# Patient Record
Sex: FEMALE | Race: WHITE | NOT HISPANIC OR LATINO | Employment: OTHER | URBAN - METROPOLITAN AREA
[De-identification: names, ages, dates, MRNs, and addresses within clinical notes are randomized per-mention and may not be internally consistent; named-entity substitution may affect disease eponyms.]

---

## 2017-01-03 ENCOUNTER — THERAPY VISIT (OUTPATIENT)
Dept: PHYSICAL THERAPY | Facility: CLINIC | Age: 57
End: 2017-01-03
Payer: COMMERCIAL

## 2017-01-03 DIAGNOSIS — M25.552 LEFT HIP PAIN: Primary | ICD-10-CM

## 2017-01-03 PROCEDURE — 97110 THERAPEUTIC EXERCISES: CPT | Mod: GP | Performed by: PHYSICAL THERAPIST

## 2017-01-03 PROCEDURE — 97161 PT EVAL LOW COMPLEX 20 MIN: CPT | Mod: GP | Performed by: PHYSICAL THERAPIST

## 2017-01-03 NOTE — PROGRESS NOTES
Subjective:    Marycruz Tran is a 56 year old female with a left hip condition.  Condition occurred with:  Repetition/overuse.  Condition occurred: during recreation/sport.  This is a new and recurrent condition  Pt reports she had a left hamstring problem that she has had in the past. She likes to run 3 miles 3x week and about 2 months ago she began to note some pain in the left ischial tuberosity area that has been getting worse. She self referred for PT. She has pain with running and walking and sitting. She belongs to a club and can do eliptical, she is not sure if it hurt or not. She has had PT in the past that was helpful.    Patient reports pain:  Other (ischial tuberosity).  Radiates to:  Gluteals.  Pain is described as aching and is intermittent and reported as 8/10.   Pain is worse during the day and worse in the P.M..  Symptoms are exacerbated by sitting and walking and relieved by rest.  Since onset symptoms are gradually worsening.                                                    Objective:    System                                           Hip Evaluation  HIP AROM:  AROM:   Left Hip:     Normal    Right Hip:                    Hip PROM:  Hip PROM:  Left Hip:   Normal  Right Hip:                           Hip Strength:    Flexion:   Left: 5-/5   Pain:                      Extension:  Left: 4+/5  Pain:  Abduction:  Left: 4+/5     Pain:    Internal Rotation:  Left: 4+/5    Pain:  External Rotation:  Left: 4+/5   Pain:    Knee Flexion:  Left: 5-/5   -  Pain:  Knee Extension:  Left: 5/5   Pain:        Hip Special Testing:      Left hip negative for the following special tests:  Piriformis; Elias; Fadir/Labrum or SLR      Hip Palpation:  Normal (no tenderness over ischial tuberosity)         Functional Testing:          Quad:    Single leg squat:   Left:    Moderate loss of control  Right:   Moderate loss of control    Bilateral leg squat:  Mild loss of control                    General      ROS    Assessment/Plan:      Patient is a 56 year old female with left side hip complaints.    Patient has the following significant findings with corresponding treatment plan.                Diagnosis 1:  L hamstring strain  Pain -  hot/cold therapy, US and mechanical traction  Decreased strength - therapeutic exercise and therapeutic activities  Impaired muscle performance - neuro re-education  Decreased function - therapeutic activities    Therapy Evaluation Codes:   1) History comprised of:   Personal factors that impact the plan of care:      None.    Comorbidity factors that impact the plan of care are:      None.     Medications impacting care: None.  2) Examination of Body Systems comprised of:   Body structures and functions that impact the plan of care:      Hip, Pelvis and Sacral illiac joint.   Activity limitations that impact the plan of care are:      Sitting, Squatting/kneeling and Walking.   Clinical presentation characteristics are:    Stable/Uncomplicated.  3) Presentation comprised of:   Presentation scored as Low complexity with uncomplicated characteristics..  4) Decision-Making    Low complexity using standardized patient assessment instrument and/or measureable assessment of functional outcome.  Cumulative Therapy Evaluation is: Low complexity.    Previous and current functional limitations:  (See Goal Flow Sheet for this information)    Short term and Long term goals: (See Goal Flow Sheet for this information)     Communication ability:  Patient appears to be able to clearly communicate and understand verbal and written communication and follow directions correctly.  Treatment Explanation - The following has been discussed with the patient:   RX ordered/plan of care  Anticipated outcomes  Possible risks and side effects  This patient would benefit from PT intervention to resume normal activities.   Rehab potential is good.    Frequency:  1 X week, once daily  Duration:  for 6  weeks  Discharge Plan:  Achieve all LTG.  Independent in home treatment program.  Reach maximal therapeutic benefit.    Please refer to the daily flowsheet for treatment today, total treatment time and time spent performing 1:1 timed codes.

## 2017-01-04 NOTE — PROGRESS NOTES
Subjective:                                       Pertinent medical history includes:  Osteoporosis.  Medical allergies: yes (Augmentin).  Other surgeries include:  Orthopedic surgery and other (Broken Wrist - Plate, Laparoscopy for endometriosis).  Current medications:  Pain medication and anti-inflammatory.  Current occupation is P.R. Consultant.    Primary job tasks include:  Prolonged sitting and other (Computer Work).                              Objective:    System    Physical Exam    General     ROS    Assessment/Plan:

## 2017-01-10 ENCOUNTER — MYC MEDICAL ADVICE (OUTPATIENT)
Dept: OBGYN | Facility: CLINIC | Age: 57
End: 2017-01-10

## 2017-01-11 ENCOUNTER — MYC MEDICAL ADVICE (OUTPATIENT)
Dept: OBGYN | Facility: CLINIC | Age: 57
End: 2017-01-11

## 2017-01-11 NOTE — TELEPHONE ENCOUNTER
Called pt and verified when flu shot was recevied. Pt stated 11/5/16. Pt is also requesting her chart be updated with the other immunizations she has recevied. Informed pt I would do as soon as I can.

## 2017-01-17 ENCOUNTER — THERAPY VISIT (OUTPATIENT)
Dept: PHYSICAL THERAPY | Facility: CLINIC | Age: 57
End: 2017-01-17
Payer: COMMERCIAL

## 2017-01-17 DIAGNOSIS — M25.552 LEFT HIP PAIN: Primary | ICD-10-CM

## 2017-01-17 PROCEDURE — 97110 THERAPEUTIC EXERCISES: CPT | Mod: GP | Performed by: PHYSICAL THERAPIST

## 2017-01-17 PROCEDURE — 97112 NEUROMUSCULAR REEDUCATION: CPT | Mod: GP | Performed by: PHYSICAL THERAPIST

## 2017-01-24 ENCOUNTER — THERAPY VISIT (OUTPATIENT)
Dept: PHYSICAL THERAPY | Facility: CLINIC | Age: 57
End: 2017-01-24
Payer: COMMERCIAL

## 2017-01-24 DIAGNOSIS — M25.552 LEFT HIP PAIN: Primary | ICD-10-CM

## 2017-01-24 PROCEDURE — 97110 THERAPEUTIC EXERCISES: CPT | Mod: GP | Performed by: PHYSICAL THERAPIST

## 2017-01-24 PROCEDURE — 97112 NEUROMUSCULAR REEDUCATION: CPT | Mod: GP | Performed by: PHYSICAL THERAPIST

## 2017-02-01 ENCOUNTER — THERAPY VISIT (OUTPATIENT)
Dept: PHYSICAL THERAPY | Facility: CLINIC | Age: 57
End: 2017-02-01
Payer: COMMERCIAL

## 2017-02-01 DIAGNOSIS — M25.552 LEFT HIP PAIN: Primary | ICD-10-CM

## 2017-02-01 PROCEDURE — 97140 MANUAL THERAPY 1/> REGIONS: CPT | Mod: GP | Performed by: PHYSICAL THERAPIST

## 2017-02-01 PROCEDURE — 97110 THERAPEUTIC EXERCISES: CPT | Mod: GP | Performed by: PHYSICAL THERAPIST

## 2017-02-01 PROCEDURE — 97035 APP MDLTY 1+ULTRASOUND EA 15: CPT | Mod: GP | Performed by: PHYSICAL THERAPIST

## 2017-02-08 ENCOUNTER — THERAPY VISIT (OUTPATIENT)
Dept: PHYSICAL THERAPY | Facility: CLINIC | Age: 57
End: 2017-02-08
Payer: COMMERCIAL

## 2017-02-08 DIAGNOSIS — M25.552 LEFT HIP PAIN: Primary | ICD-10-CM

## 2017-02-08 PROCEDURE — 97035 APP MDLTY 1+ULTRASOUND EA 15: CPT | Mod: GP | Performed by: PHYSICAL THERAPIST

## 2017-02-08 PROCEDURE — 97112 NEUROMUSCULAR REEDUCATION: CPT | Mod: GP | Performed by: PHYSICAL THERAPIST

## 2017-02-08 PROCEDURE — 97110 THERAPEUTIC EXERCISES: CPT | Mod: GP | Performed by: PHYSICAL THERAPIST

## 2017-02-08 PROCEDURE — 97140 MANUAL THERAPY 1/> REGIONS: CPT | Mod: GP | Performed by: PHYSICAL THERAPIST

## 2017-02-15 ENCOUNTER — THERAPY VISIT (OUTPATIENT)
Dept: PHYSICAL THERAPY | Facility: CLINIC | Age: 57
End: 2017-02-15
Payer: COMMERCIAL

## 2017-02-15 DIAGNOSIS — M25.552 LEFT HIP PAIN: ICD-10-CM

## 2017-02-15 PROCEDURE — 97140 MANUAL THERAPY 1/> REGIONS: CPT | Mod: GP | Performed by: PHYSICAL THERAPIST

## 2017-02-15 PROCEDURE — 97110 THERAPEUTIC EXERCISES: CPT | Mod: GP | Performed by: PHYSICAL THERAPIST

## 2017-02-15 PROCEDURE — 97035 APP MDLTY 1+ULTRASOUND EA 15: CPT | Mod: GP | Performed by: PHYSICAL THERAPIST

## 2017-02-15 PROCEDURE — 97112 NEUROMUSCULAR REEDUCATION: CPT | Mod: GP | Performed by: PHYSICAL THERAPIST

## 2017-02-22 ENCOUNTER — THERAPY VISIT (OUTPATIENT)
Dept: PHYSICAL THERAPY | Facility: CLINIC | Age: 57
End: 2017-02-22
Payer: COMMERCIAL

## 2017-02-22 DIAGNOSIS — M25.552 LEFT HIP PAIN: ICD-10-CM

## 2017-02-22 PROCEDURE — 97035 APP MDLTY 1+ULTRASOUND EA 15: CPT | Mod: GP | Performed by: PHYSICAL THERAPIST

## 2017-02-22 PROCEDURE — 97110 THERAPEUTIC EXERCISES: CPT | Mod: GP | Performed by: PHYSICAL THERAPIST

## 2017-02-22 PROCEDURE — 97140 MANUAL THERAPY 1/> REGIONS: CPT | Mod: GP | Performed by: PHYSICAL THERAPIST

## 2017-02-22 NOTE — PROGRESS NOTES
Subjective:    HPI                    Objective:    System    Physical Exam    General     ROS    Assessment/Plan:      PROGRESS  REPORT    Progress reporting period is from 1-3-17 to 2-22-17.       SUBJECTIVE   Subjective: Still has relief after PT for several days until she took a 3 mile walk (45') after that she has had more discomfort in the glut.     Current Pain level:0- 4/10.     Previous pain level was  4/10  .   Changes in function:  Yes (See Goal flowsheet attached for changes in current functional level)  Adverse reaction to treatment or activity: None    OBJECTIVE  Changes noted in objective findings:  The objective findings below are from DOS 2-22-17.  Objective: Is also having some medial knee pain for the past 4 days or so.  Sitting in the car was not painful but sitting in bed was very uncomfotable Medial HS is tender to palpate at the knee and slightly swollen. May be due to SL dead lifts she will dc these for now. Less tenderness to palpate the piriformis noted today. Plans to see Forest Tu WILEY for several visits then return to me. if she has started running again I will do a running eval on the treadmill next.      ASSESSMENT/PLAN  Updated problem list and treatment plan: Diagnosis 1:  L buttock pain  Pain -  hot/cold therapy, US and manual therapy  Decreased joint mobility - manual therapy and therapeutic exercise  Impaired muscle performance - neuro re-education  Decreased function - therapeutic activities  STG/LTGs have been met or progress has been made towards goals:  Yes (See Goal flow sheet completed today.)  Assessment of Progress: The patient's condition has potential to improve.  Self Management Plans:  Patient has been instructed in a home treatment program.  I have re-evaluated this patient and find that the nature, scope, duration and intensity of the therapy is appropriate for the medical condition of the patient.  Marycruz continues to require the following intervention to meet  STG and LTG's:  PT    Recommendations:  This patient would benefit from continued therapy.     Frequency:  1 X week, once daily  Duration:  for 6 weeks        Please refer to the daily flowsheet for treatment today, total treatment time and time spent performing 1:1 timed codes.

## 2017-02-28 ENCOUNTER — THERAPY VISIT (OUTPATIENT)
Dept: CHIROPRACTIC MEDICINE | Facility: CLINIC | Age: 57
End: 2017-02-28
Payer: COMMERCIAL

## 2017-02-28 DIAGNOSIS — M25.552 HIP PAIN, LEFT: ICD-10-CM

## 2017-02-28 DIAGNOSIS — M99.05 SOMATIC DYSFUNCTION OF PELVIS REGION: Primary | ICD-10-CM

## 2017-02-28 DIAGNOSIS — M79.10 MYALGIA: ICD-10-CM

## 2017-02-28 PROCEDURE — 99202 OFFICE O/P NEW SF 15 MIN: CPT | Performed by: CHIROPRACTOR

## 2017-02-28 NOTE — MR AVS SNAPSHOT
After Visit Summary   2/28/2017    Marycruz Tran    MRN: [id number]           Patient Information     Date Of Birth          1960        Visit Information        Provider Department      2/28/2017 4:00 PM Forest Barroso DC [facility] [place] [facility]        Today's Diagnoses     Somatic dysfunction of pelvis region    -  1    Hip pain, left        Myalgia           Follow-ups after your visit        Your next 10 appointments already scheduled     Mar 08, 2017  9:45 AM CST   AN[facility] Chiropractor with Forest Barroso DC   [facility] [place] [facility] ([facility] [place]  )    [address]  [place] [address]   [phone]            Mar 10, 2017 11:00 AM CST   ANÍBAL Running with Radha Mccann PT   [facility] ([facility]  )    [address]  [facility] [id number] [phone]            Mar 14, 2017  4:45 PM CDT   ANÍBAL Chiropractor with Forest Barroso DC   [facility] ([facility] [place])    [address]  [place] [address] [phone]              Who to contact     If you have questions or need follow up information about today's clinic visit or your schedule please contact [facility] directly at [phone].  Normal or non-critical lab and imaging results will be communicated to you by MyChart, letter or phone within 4 business days after the clinic has received the results. If you do not hear from us within 7 days, please contact the clinic through MyChart or phone. If you have a critical or abnormal lab result, we will notify you by phone as soon as possible.  Submit refill requests through [employer] or call your pharmacy and they will forward the refill request to us. Please allow 3 business days for your refill to be completed.          Additional Information About Your  Visit        MyChart Information     [employer] gives you secure access to your electronic health record. If you see a primary care provider, you can also send messages to your care team and make appointments. If you have questions, please call your primary care clinic.  If you do not have a primary care provider, please call [phone] and they will assist you.        Care EveryWhere ID     This is your Care EveryWhere ID. This could be used by other organizations to access your [place] medical records  [id number]         Blood Pressure from Last 3 Encounters:   12/06/16 120/80   10/03/16 104/75   01/08/16 113/81    Weight from Last 3 Encounters:   12/06/16 68.4 kg (150 lb 12.8 oz)   10/03/16 63.5 kg (140 lb)   01/08/16 66.8 kg (147 lb 3.2 oz)              We Performed the Following     OFFICE/OUTPT VISIT,SHAE WHARTON II        Primary Care Provider Office Phone # Fax #    Fabienne Haley [phone]       [facility] WOM[facility] [address] [place] [address] [facility] [address]  [facility] [address]        Thank you!     Thank you for choosing [facility]  for your care. Our goal is always to provide you with excellent care. Hearing back from our patients is one way we can continue to improve our services. Please take a few minutes to complete the written survey that you may receive in the mail after your visit with us. Thank you!             Your Updated Medication List - Protect others around you: Learn how to safely use, store and throw away your medicines at www.disposemymeds.org.          This list is accurate as of: 2/28/17 10:08 PM.  Always use your most recent med list.                   Brand Name Dispense Instructions for use    calcium carb-cholecalciferol 600-500 MG-UNIT Caps      Take 1 tablet by mouth 2 times daily       estradiol 0.1 MG/GM cream    ESTRACE VAGINAL    42.5 g    Place 1 g vaginally twice a week Place 1g vaginally nightly x 2 weeks and then twice  brenda for maintenance       IBUPROFEN PO      Take 400 mg by mouth every 4 hours as needed for moderate pain       Multi-vitamin Tabs tablet      Take 1 tablet by mouth daily

## 2017-02-28 NOTE — PROGRESS NOTES
[facility]  Feb 28, 2017    I have been seeing Radha , PT, at the [facility] since early January for a running injury. She thinks it s piriformis syndrome, compounded by my body s apparent reluctance to use my glutes when it should. ;)    Radha was going to email you with my record and treatment thus far, and she said I should reach out directly to see if you think coming to see you would be a helpful addition to getting me back on track. My pain has improved this past week, but it Is still present and gets easily aggravated. I won t return to running until things are ok.    I had also heard of you from a running friend of mine (Raquel Patel) who said she knew a number of runners who d had successful treatment with you.    I really enjoy working with Radha, and will continue to do so as appropriate - but she thought you might be able to do some things beyond what she s offering. I am open to whatever will get me out of pain and back on the running path.    Please let me know if I should make an appointment to see you - and how to do so.    Subjective:  Marycruz rTan   56 year old   female    CC: Left butt muscle pain and knee pain, referral from Radha PT  Medications reviewed: Denies any medication s  Visit: 1/6  Goal: sit for 30 minutes without pain, walk 30 minutes without pain, decrease pain 50%, run again    Location: L posterior hip   KAILA: Notes runner, 30 years, slow and steady according to her, notes previous history of this pain, notes few months ago started up again. Has not ran since 12/2016. Started seeing PT in 1/2017 weekly and notes that she has been working on piriformis syndrome and working on glute strength. Notes some progress. Notes also saw DC a couple times. Walking is going OK, but that has created pain in past.   Pain: varies but 3/10 on average, worse with sitting  Previous History: broken left wrist in January 2016  FAM   Progression: PT has helped  Quality: ache and tightness   Radiation: Denies current or previous   Pain is worse with: sitting for long periods, long walks, unable to walk long periods   Pain is better with: stretching, exercises   Timing: frequent pain   Under care: Has worked with PT and DC for this  Imaging: Denies any   Social: Alert, oriented, and active. 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       Objective:  Inspection:  No SDD  No Scars  Normal Gait    Palpation:  No specific pain  Palpable soreness over L posterior hip   Myofascitis 2/4 noted over L piriformis    ROM:  Lumbar flexion   90/90  Lumbar extension  30/30, mild lower back discomfort   Right Hip IR  38/45  Left Hip IR  29/45  Right Hip ER  42/60  Left  hip ER  39/60  Hip abduction limited on left 5 degrees with no pain  Hip adduction symmetric     MMT:  Left glute med 4/5 with no pain  Right glute med 5/5 with no pain  Left TFL 4/5 with no pain  Right TFL 5/5 with no pain  Left piriformis 5/5 with no pain  Right piriformis 5/5 with no pain    MET:  Right short leg  Right superior pubic bone  Right hip out flare    Squat:  Shift to right  Foot flare to left  Arm drop on right  Left knee genu valgum     Lunge:  L knee genu valgum  L knee cross over     NAL:  Restricted SI on left side    Neuro:  Able to toe walk and heel walk  L4-S1 light touch WNL    Ortho:  SLR (tightness left hamstring), jazmin (posteiror hip pain on left), Fader (-), SI compression (-)     Assessment:  NAL with associated myofascitis and weakness  Hip pain     Plan:   Decrease pain 50%    Restore symmetric hip IR   Restore symmetric hip ER   Strengthen hip stabilizers to 5/5 B   Restore pelvic leveling   Restore segmental motion   Functional goals in history    Patient was given detailed history, review of symptoms, examination, functional examination, and report of findings.. Patients treatment plan with be 2 times per week for 2 weeks  followed by 1 time per week for 2 weeks. Following treatment plan a follow up exam will be done to make sure patient is improving. Treatment frequency will degrease as patients subjective complaints improve as well as objective findings. Prognosis for care is good based on fact that patient is active and is willing to take active approach in care.     Patient tolerated treatment well today  Treatment Time: 45 minutes  [address] manipulation 1-2 segments: MET, Hip LAD  96473 Manual therapy: (ART, Graston, Strain Counter Strain, Fascial Manipulation, Cupping) performed over area of   89876 therapeutic exercise (30 minutes):Review of the following: clams, side lying leg raises, single leg step, tried to do band walks, marching bridges, bridges with walks, flossing has helped in past.  Today: gave seated piriformis flossing    Strapping: hip IR and lateral glide on left  Taping:  Next visit: 1 week  Dry needle: 4 posterior hip (tolerated well)      Forest Barroso DC, MEd, ATC

## 2017-03-08 ENCOUNTER — THERAPY VISIT (OUTPATIENT)
Dept: CHIROPRACTIC MEDICINE | Facility: CLINIC | Age: 57
End: 2017-03-08
Payer: COMMERCIAL

## 2017-03-08 DIAGNOSIS — M79.10 MYALGIA: ICD-10-CM

## 2017-03-08 DIAGNOSIS — M25.552 HIP PAIN, LEFT: ICD-10-CM

## 2017-03-08 DIAGNOSIS — M99.05 SOMATIC DYSFUNCTION OF PELVIS REGION: Primary | ICD-10-CM

## 2017-03-08 PROCEDURE — 97110 THERAPEUTIC EXERCISES: CPT | Performed by: CHIROPRACTOR

## 2017-03-08 PROCEDURE — 98940 CHIROPRACT MANJ 1-2 REGIONS: CPT | Mod: AT | Performed by: CHIROPRACTOR

## 2017-03-10 ENCOUNTER — THERAPY VISIT (OUTPATIENT)
Dept: PHYSICAL THERAPY | Facility: CLINIC | Age: 57
End: 2017-03-10
Payer: COMMERCIAL

## 2017-03-10 DIAGNOSIS — M25.552 LEFT HIP PAIN: ICD-10-CM

## 2017-03-10 PROCEDURE — 97110 THERAPEUTIC EXERCISES: CPT | Mod: GP | Performed by: PHYSICAL THERAPIST

## 2017-03-10 PROCEDURE — 97112 NEUROMUSCULAR REEDUCATION: CPT | Mod: GP | Performed by: PHYSICAL THERAPIST

## 2017-03-10 NOTE — MR AVS SNAPSHOT
After Visit Summary   3/10/2017    Marycruz Tran    MRN: [id number]           Patient Information     Date Of Birth          1960        Visit Information        Provider Department      3/10/2017 11:00 AM Radha Mccann PT [facility]        Today's Diagnoses     Left hip pain           Follow-ups after your visit        Your next 10 appointments already scheduled     Mar 14, 2017  4:45 PM CDT   ANÍBAL Chiropractor with Forest Barroso DC   [facility] [place] [facility] ([facility] [place])    [address] [place] [address]  [address]  [place] [address]   [phone]            Mar 29, 2017  9:45 AM CDT   ANÍBAL Chiropractor with Forest Barroso DC   [facility] [place] [facility] ([facility] [place]  )    [address]  [place] [address]   [phone]            Apr 12, 2017  1:15 PM CDT   ANÍBAL Chiropractor with Forest Barroso DC   [facility] [place] [facility] ([facility] [place]  )    [address]  [place] [address]   [phone]              Who to contact     If you have questions or need follow up information about today's clinic visit or your schedule please contact [facility] directly at [phone].  Normal or non-critical lab and imaging results will be communicated to you by [employer], letter or phone within 4 business days after the clinic has received the results. If you do not hear from us within 7 days, please contact the clinic through [employer] or phone. If you have a critical or abnormal lab result, we will notify you by phone as soon as possible.  Submit refill requests through [employer] or call your pharmacy and they will forward the refill request to us. Please allow 3 business days for your refill to be completed.          Additional Information About Your Visit        [employer] Information     [employer] gives you secure access to your electronic health record. If  you see a primary care provider, you can also send messages to your care team and make appointments. If you have questions, please call your primary care clinic.  If you do not have a primary care provider, please call [phone] and they will assist you.        Care EveryWhere ID     This is your Care EveryWhere ID. This could be used by other organizations to access your [place] medical records  [id number]         Blood Pressure from Last 3 Encounters:   12/06/16 120/80   10/03/16 104/75   01/08/16 113/81    Weight from Last 3 Encounters:   12/06/16 68.4 kg (150 lb 12.8 oz)   10/03/16 63.5 kg (140 lb)   01/08/16 66.8 kg (147 lb 3.2 oz)              We Performed the Following     NEUROMUSCULAR RE-EDUCATION     THERAPEUTIC EXERCISES        Primary Care Provider Office Phone # Fax #    Fabienne Haley [phone]       [facility] [address] [facility] [address]  [facility] [address]        Thank you!     Thank you for [facility] THER[facility]  for your care. Our goal is always to provide you with excellent care. Hearing back from our patients is one way we can continue to improve our services. Please take a few minutes to complete the written survey that you may receive in the mail after your visit with us. Thank you!             Your Updated Medication List - Protect others around you: Learn how to safely use, store and throw away your medicines at www.disposemymeds.org.          This list is accurate as of: 3/10/17 11:42 AM.  Always use your most recent med list.                   Brand Name Dispense Instructions for use    calcium carb-cholecalciferol 600-500 MG-UNIT Caps      Take 1 tablet by mouth 2 times daily       estradiol 0.1 MG/GM cream    ESTRACE VAGINAL    42.5 g    Place 1 g vaginally twice a week Place 1g vaginally nightly x 2 weeks and then twice weely for maintenance       IBUPROFEN PO      Take 400 mg by mouth every 4 hours as  needed for moderate pain       Multi-vitamin Tabs tablet      Take 1 tablet by mouth daily

## 2017-03-10 NOTE — PROGRESS NOTES
[place] [facility]  Feb 28, 2017    I have been seeing Radha , PT, at the [facility] since early January for a running injury. She thinks it s piriformis syndrome, compounded by my body s apparent reluctance to use my glutes when it should. ;)    Radha was going to email you with my record and treatment thus far, and she said I should reach out directly to see if you think coming to see you would be a helpful addition to getting me back on track. My pain has improved this past week, but it Is still present and gets easily aggravated. I won t return to running until things are ok.    I had also heard of you from a running friend of mine (Raquel Patel) who said she knew a number of runners who d had successful treatment with you.    I really enjoy working with Radha, and will continue to do so as appropriate - but she thought you might be able to do some things beyond what she s offering. I am open to whatever will get me out of pain and back on the running path.    Please let me know if I should make an appointment to see you - and how to do so.    Subjective:  Marycruz Tran   56 year old   female    CC: Left butt muscle pain and knee pain, referral from Radah PT  Medications reviewed: Denies any medication s  Visit: 1/6  Goal: sit for 30 minutes without pain, walk 30 minutes without pain, decrease pain 50%, run again    Location: L posterior hip   KAILA: Notes runner, 30 years, slow and steady according to her, notes previous history of this pain, notes few months ago started up again. Has not ran since 12/2016. Started seeing PT in 1/2017 weekly and notes that she has been working on piriformis syndrome and working on glute strength. Notes some progress. Notes also saw DC a couple times. Walking is going OK, but that has created pain in past.   Pain: varies but 3/10 on average, worse with sitting  Comes in today doing OK. Notes was not sore from  session. Wants to make sure she is stretching right. Asked if she should go to PT session later in week and I thought it was good idea. She notes she tolerated exam well and denies any new issues.       Objective:  Inspection:  No SDD  No Scars  Normal Gait    Palpation:  No specific pain  Palpable soreness over L posterior hip   Myofascitis 2/4 noted over L piriformis    ROM:  Lumbar flexion   90/90  Lumbar extension  30/30, mild lower back discomfort   Right Hip IR  38/45  Left Hip IR  29/45  Right Hip ER  42/60  Left  hip ER  39/60  Hip abduction limited on left 5 degrees with no pain  Hip adduction symmetric     MMT:  Left glute med 4/5 with no pain  Right glute med 5/5 with no pain  Left TFL 4/5 with no pain  Right TFL 5/5 with no pain  Left piriformis 5/5 with no pain  Right piriformis 5/5 with no pain    MET:  Right short leg  Right superior pubic bone  Right hip out flare    Squat:  Shift to right  Foot flare to left  Arm drop on right  Left knee genu valgum     Lunge:  L knee genu valgum  L knee cross over     NAL:  Restricted SI on left side      Ortho:  SLR (tightness left hamstring), jazmin (posteiror hip pain on left), Fader (-), SI compression (-)     Assessment:  NAL with associated myofascitis and weakness  Hip pain     Plan:    Patient tolerated treatment well today  Treatment Time: 45 minutes  78197 manipulation 1-2 segments: MET, Hip LAD  70321 Manual therapy: (ART, Graston, Strain Counter Strain, Fascial Manipulation, Cupping) performed over area of   88359 therapeutic exercise (30 minutes):Review of the following: clams, side lying leg raises, single leg step, tried to do band walks, marching bridges, bridges with walks, flossing has helped in past.  Today: gave seated piriformis flossing, reverse clams with yellow band  Strapping: hip IR and lateral glide on left  Taping:  Next visit: 1 week, recommended follow up with PT  Dry needle: 10 posterior hip (tolerated well)      Forest Barroso DC,  MEd, ATC

## 2017-03-14 ENCOUNTER — THERAPY VISIT (OUTPATIENT)
Dept: CHIROPRACTIC MEDICINE | Facility: CLINIC | Age: 57
End: 2017-03-14
Payer: COMMERCIAL

## 2017-03-14 DIAGNOSIS — M25.552 HIP PAIN, LEFT: ICD-10-CM

## 2017-03-14 DIAGNOSIS — M79.10 MYALGIA: ICD-10-CM

## 2017-03-14 DIAGNOSIS — M99.05 SOMATIC DYSFUNCTION OF PELVIS REGION: Primary | ICD-10-CM

## 2017-03-14 PROCEDURE — 97110 THERAPEUTIC EXERCISES: CPT | Performed by: CHIROPRACTOR

## 2017-03-14 PROCEDURE — 98940 CHIROPRACT MANJ 1-2 REGIONS: CPT | Mod: AT | Performed by: CHIROPRACTOR

## 2017-03-14 NOTE — MR AVS SNAPSHOT
After Visit Summary   3/14/2017    Marycruz Tran    MRN: [id number]           Patient Information     Date Of Birth          1960        Visit Information        Provider Department      3/14/2017 4:45 PM Forest Barroso DC [place] [facility]        Today's Diagnoses     Somatic dysfunction of pelvis region    -  1    Hip pain, left        Myalgia           Follow-ups after your visit        Your next 10 appointments already scheduled     Mar 29, 2017  9:45 AM CDT   [facility] Chiropractor with Forest Barroso DC   [facility] [place] [facility] ([facility] [place]  )    [address] [place] [address]. [address]  [place] [address]   [phone]            Apr 12, 2017  1:15 PM CDT   AN[facility]BAL Chiropractor with SAURABH [facility] [place] [facility] ([facility] [place]  )    [address]  [place] [address]   [phone]              Who to contact     If you have questions or need follow up information about today's clinic visit or your schedule please contact [place] [facility] directly at [phone].  Normal or non-critical lab and imaging results will be communicated to you by [employer], letter or phone within 4 business days after the clinic has received the results. If you do not hear from us within 7 days, please contact the clinic through [employer] or phone. If you have a critical or abnormal lab result, we will notify you by phone as soon as possible.  Submit refill requests through [employer] or call your pharmacy and they will forward the refill request to us. Please allow 3 business days for your refill to be completed.          Additional Information About Your Visit        [employer] Information     [employer] gives you secure access to your electronic health record. If you see a primary care provider, you can also send messages to your care team and make appointments. If you have questions, please call your primary care clinic.  If you  do not have a primary care provider, please call [phone] and they will assist you.        Care EveryWhere ID     This is your Care EveryWhere ID. This could be used by other organizations to access your [place] medical records  [id number]         Blood Pressure from Last 3 Encounters:   12/06/16 120/80   10/03/16 104/75   01/08/16 113/81    Weight from Last 3 Encounters:   12/06/16 68.4 kg (150 lb 12.8 oz)   10/03/16 63.5 kg (140 lb)   01/08/16 66.8 kg (147 lb 3.2 oz)              We Performed the Following     CHIROPRAC MANIP,SPINAL,1-2 REGIONS     THERAPEUTIC EXERCISES        Primary Care Provider Office Phone # Fax #    Fabienne Haley [phone]       [facility] [address]  [place] [address]        Thank you!     Thank you for [facility]  [facility]  for your care. Our goal is always to provide you with excellent care. Hearing back from our patients is one way we can continue to improve our services. Please take a few minutes to complete the written survey that you may receive in the mail after your visit with us. Thank you!             Your Updated Medication List - Protect others around you: Learn how to safely use, store and throw away your medicines at www.disposemymeds.org.          This list is accurate as of: 3/14/17  7:21 PM.  Always use your most recent med list.                   Brand Name Dispense Instructions for use    calcium carb-cholecalciferol 600-500 MG-UNIT Caps      Take 1 tablet by mouth 2 times daily       estradiol 0.1 MG/GM cream    ESTRACE VAGINAL    42.5 g    Place 1 g vaginally twice a week Place 1g vaginally nightly x 2 weeks and then twice weely for maintenance       IBUPROFEN PO      Take 400 mg by mouth every 4 hours as needed for moderate pain       Multi-vitamin Tabs tablet      Take 1 tablet by mouth daily

## 2017-03-15 NOTE — PROGRESS NOTES
[place] [facility]  Feb 28, 2017    I have been seeing Radha , PT, at the [facility] since early January for a running injury. She thinks it s piriformis syndrome, compounded by my body s apparent reluctance to use my glutes when it should. ;)    Radha was going to email you with my record and treatment thus far, and she said I should reach out directly to see if you think coming to see you would be a helpful addition to getting me back on track. My pain has improved this past week, but it Is still present and gets easily aggravated. I won t return to running until things are ok.    I had also heard of you from a running friend of mine (Raquel Patel) who said she knew a number of runners who d had successful treatment with you.    I really enjoy working with Radha, and will continue to do so as appropriate - but she thought you might be able to do some things beyond what she s offering. I am open to whatever will get me out of pain and back on the running path.    Please let me know if I should make an appointment to see you - and how to do so.    Subjective:  Marycruz Tran   56 year old   female    CC: Left butt muscle pain and knee pain, referral from Radha PT  Medications reviewed: Denies any medication s  Visit: 2/6  Goal: sit for 30 minutes without pain, walk 30 minutes without pain, decrease pain 50%, run again    Location: L posterior hip   KAILA: Notes runner, 30 years, slow and steady according to her, notes previous history of this pain, notes few months ago started up again. Has not ran since 12/2016. Started seeing PT in 1/2017 weekly and notes that she has been working on piriformis syndrome and working on glute strength. Notes some progress. Notes also saw DC a couple times. Walking is going OK, but that has created pain in past.   Pain: varies but 3/10 on average, worse with sitting  Comes in today doing OK. Had follow up with PT and  went well. Tweaked some of her exercises. Notes tolerated treatment well. Notes some good days and not so good days. Notes not constant pain. Sitting is worse. Notes similar symptoms and denies any new issues.        Objective:  Inspection:  No SDD  No Scars  Normal Gait    Palpation:  No specific pain  Palpable soreness over L posterior hip   Myofascitis 2/4 noted over L piriformis    ROM:  Lumbar flexion   90/90  Lumbar extension  30/30, mild lower back discomfort   Right Hip IR  38/45  Left Hip IR  29/45  Right Hip ER  42/60  Left  hip ER  39/60  Hip abduction limited on left 5 degrees with no pain  Hip adduction symmetric     MMT:  Left glute med 4/5 with no pain  Right glute med 5/5 with no pain  Left TFL 4/5 with no pain  Right TFL 5/5 with no pain  Left piriformis 5/5 with no pain  Right piriformis 5/5 with no pain    MET:  Right short leg  Right superior pubic bone  Right hip out flare    Squat:  Shift to right  Foot flare to left  Arm drop on right  Left knee genu valgum     Lunge:  L knee genu valgum  L knee cross over     NAL:  Restricted SI on left side      Ortho:  SLR (tightness left hamstring), jazmin (posteiror hip pain on left), Fader (-), SI compression (-)     Assessment:  NAL with associated myofascitis and weakness  Hip pain     Plan:    Patient tolerated treatment well today  Treatment Time: 45 minutes  62771 manipulation 1-2 segments: MET, Hip LAD  18945 Manual therapy: (ART, Graston, Strain Counter Strain, Fascial Manipulation, Cupping) performed over area of   52829 therapeutic exercise (30 minutes):Review of the following: clams, side lying leg raises, single leg step, tried to do band walks, marching bridges, bridges with walks, flossing has helped in past.  Today: gave seated piriformis flossing, reverse clams with yellow band, single leg RDL's  Reviewed step downs, butterfly stretch, clams, marching  Bridges   Strapping: hip IR and lateral glide on left  Taping:  Shock wave: 20/12,  posterior hip, felt deep ache  Next visit: 1 week, recommended follow up with PT  Dry needle: 10 posterior hip (tolerated well)      Forest Barroso DC, MEd, ATC

## 2017-03-31 ENCOUNTER — THERAPY VISIT (OUTPATIENT)
Dept: CHIROPRACTIC MEDICINE | Facility: CLINIC | Age: 57
End: 2017-03-31
Payer: COMMERCIAL

## 2017-03-31 DIAGNOSIS — M76.899 HAMSTRING TENDINITIS AT ORIGIN: ICD-10-CM

## 2017-03-31 DIAGNOSIS — M25.552 HIP PAIN, LEFT: ICD-10-CM

## 2017-03-31 DIAGNOSIS — M79.10 MYALGIA: ICD-10-CM

## 2017-03-31 DIAGNOSIS — M99.05 SOMATIC DYSFUNCTION OF PELVIS REGION: Primary | ICD-10-CM

## 2017-03-31 PROCEDURE — 97110 THERAPEUTIC EXERCISES: CPT | Performed by: CHIROPRACTOR

## 2017-03-31 PROCEDURE — 98940 CHIROPRACT MANJ 1-2 REGIONS: CPT | Mod: AT | Performed by: CHIROPRACTOR

## 2017-03-31 NOTE — MR AVS SNAPSHOT
After Visit Summary   3/31/2017    Marycruz Tran    MRN: [id number]           Patient Information     Date Of Birth          1960        Visit Information        Provider Department      3/31/2017 3:15 PM Forest Barroso DC [facility] [place] Chiropractor        Today's Diagnoses     Somatic dysfunction of pelvis region    -  1    Hip pain, left        Myalgia        Hamstring tendinitis at origin           Follow-ups after your visit        Your next 10 appointments already scheduled     Apr 14, 2017  3:15 PM CDT   [facility] Chiropractor with Forest Barroso DC   [facility] [place] Chiropractor ([facility] [place])    [address] [place] [address]  [address]  [place] [address] [phone]              Who to contact     If you have questions or need follow up information about today's clinic visit or your schedule please contact [facility] [place] [facility]RACTOR directly at [phone].  Normal or non-critical lab and imaging results will be communicated to you by [employer], letter or phone within 4 business days after the clinic has received the results. If you do not hear from us within 7 days, please contact the clinic through Shopnation[employer] or phone. If you have a critical or abnormal lab result, we will notify you by phone as soon as possible.  Submit refill requests through EGEN or call your pharmacy and they will forward the refill request to us. Please allow 3 business days for your refill to be completed.          Additional Information About Your Visit        [employer] Information     EGEN gives you secure access to your electronic health record. If you see a primary care provider, you can also send messages to your care team and make appointments. If you have questions, please call your primary care clinic.  If you do not have a primary care provider, please call [phone] and they will assist you.         Care EveryWhere ID     This is your Care EveryWhere ID. This could be used by other organizations to access your [place] medical records  [id number]         Blood Pressure from Last 3 Encounters:   12/06/16 120/80   10/03/16 104/75   01/08/16 113/81    Weight from Last 3 Encounters:   12/06/16 68.4 kg (150 lb 12.8 oz)   10/03/16 63.5 kg (140 lb)   01/08/16 66.8 kg (147 lb 3.2 oz)              We Performed the Following     CHIROPRAC MANIP,SPINAL,1-2 REGIONS     THERAPEUTIC EXERCISES        Primary Care Provider Office Phone # Fax #    Fabienne Haley [phone]       [facility] [address] [address]        Thank you!     Thank you for choosing [place] [facility]  [facility] [place] CHIR[facility]RACTOR  for your care. Our goal is always to provide you with excellent care. Hearing back from our patients is one way we can continue to improve our services. Please take a few minutes to complete the written survey that you may receive in the mail after your visit with us. Thank you!             Your Updated Medication List - Protect others around you: Learn how to safely use, store and throw away your medicines at www.disposemymeds.org.          This list is accurate as of: 3/31/17  8:32 PM.  Always use your most recent med list.                   Brand Name Dispense Instructions for use    calcium carb-cholecalciferol 600-500 MG-UNIT Caps      Take 1 tablet by mouth 2 times daily       estradiol 0.1 MG/GM cream    ESTRACE VAGINAL    42.5 g    Place 1 g vaginally twice a week Place 1g vaginally nightly x 2 weeks and then twice weely for maintenance       IBUPROFEN PO      Take 400 mg by mouth every 4 hours as needed for moderate pain       Multi-vitamin Tabs tablet      Take 1 tablet by mouth daily

## 2017-04-01 NOTE — PROGRESS NOTES
[place] [facility]  Feb 28, 2017    Subjective:  Marycruz Tran   56 year old   female    CC: Left butt muscle pain and knee pain, referral from Radha PT  Medications reviewed: Denies any medication s  Visit: 3/6  Goal: sit for 30 minutes without pain, walk 30 minutes without pain, decrease pain 50%, run again    Location: L posterior hip   KAILA: Notes runner, 30 years, slow and steady according to her, notes previous history of this pain, notes few months ago started up again. Has not ran since 12/2016. Started seeing PT in 1/2017 weekly and notes that she has been working on piriformis syndrome and working on glute strength. Notes some progress. Notes also saw DC a couple times. Walking is going OK, but that has created pain in past.   Pain: varies but 3/10 on average, worse with sitting  Comes in today doing OK. Notes has had some good days and notes days with soreness. Notes it is over proximal hamstring and pain with sitting. She notes she is getting better and she is working on active care program. She denies any new issues.       Objective:  Inspection:  No SDD  No Scars  Normal Gait    Palpation:  No specific pain  Palpable soreness over L posterior hip   Myofascitis 2/4 noted over L piriformis    ROM:  Lumbar flexion   90/90  Lumbar extension  30/30, mild lower back discomfort   Right Hip IR  38/45  Left Hip IR  29/45  Right Hip ER  42/60  Left  hip ER  39/60  Hip abduction limited on left 5 degrees with no pain  Hip adduction symmetric     MMT:  Left glute med 4/5 with no pain  Right glute med 5/5 with no pain  Left TFL 4/5 with no pain  Right TFL 5/5 with no pain  Left piriformis 5/5 with no pain  Right piriformis 5/5 with no pain    MET:  Right short leg  Right superior pubic bone  Right hip out flare    Squat:  Shift to right  Foot flare to left  Arm drop on right  Left knee genu valgum     Lunge:  L knee genu valgum  L knee cross over     NAL:  Restricted SI on left  side      Ortho:  SLR (tightness left hamstring), jazmin (posteiror hip pain on left), Fader (-), SI compression (-)     Assessment:  NAL with associated myofascitis and weakness  Hip pain     Plan:    Patient tolerated treatment well today  Treatment Time: 45 minutes  14999 manipulation 1-2 segments: MET, Hip LAD  00586 Manual therapy: (ART, Graston, Strain Counter Strain, Fascial Manipulation, Cupping) performed over area of   00782 therapeutic exercise (30 minutes):Review of the following: clams, side lying leg raises, single leg step, tried to do band walks, marching bridges, bridges with walks, flossing has helped in past.  Today: gave seated piriformis flossing, reverse clams with yellow band, single leg RDL's  Reviewed step downs, butterfly stretch, clams, marching  Bridges  Strapping: hip IR and lateral glide on left  Taping:  Shock wave: 20/12, posterior hip, felt deep ache  Next visit: 2   Dry needle: 10 proximal hamstring  Other: did discuss possible evaluation for PRP injection if not significantly better next visit.       Forest Barroso DC, MEd, ATC

## 2017-04-11 PROBLEM — M25.552 LEFT HIP PAIN: Status: RESOLVED | Noted: 2017-01-03 | Resolved: 2017-04-11

## 2017-04-11 NOTE — PROGRESS NOTES
Subjective:    HPI                    Objective:    System    Physical Exam    General     ROS    Assessment/Plan:      DISCHARGE REPORT    Progress reporting period is from 2-22-17 to 3-10-17.       SUBJECTIVE  Subjective: Pt reports she saw the chiropractor 2x  and thinks she is feeling better     Current Pain level: 4/10.     Previous pain level was  5/10  .   Changes in function:  Yes (See Goal flowsheet attached for changes in current functional level)  Adverse reaction to treatment or activity: None    OBJECTIVE  Changes noted in objective findings:  The objective findings below are from DOS 3-10-17.  Objective: Less pain and frequency with sitting and with walking. Revised the exs, added TB to step down and butterfly stretch because ER on L is slightly less than R. Also added postural restoration hip shift in SL. Plan for now is for her to work with Forest and see how she does. Pt has not returned to PT    ASSESSMENT/PLAN  Updated problem list and treatment plan: Diagnosis 1:  L hip pain    STG/LTGs have been met or progress has been made towards goals:  Yes (See Goal flow sheet completed today.)  Assessment of Progress: The patient's condition is improving.  Self Management Plans:  Patient has been instructed in a home treatment program.    Marycruz continues to require the following intervention to meet STG and LTG's:  PT intervention is no longer required to meet STG/LTG.    Recommendations:  This patient is ready to be discharged from therapy and continue their home treatment program.    Please refer to the daily flowsheet for treatment today, total treatment time and time spent performing 1:1 timed codes.

## 2017-04-14 ENCOUNTER — RADIANT APPOINTMENT (OUTPATIENT)
Dept: GENERAL RADIOLOGY | Facility: CLINIC | Age: 57
End: 2017-04-14
Attending: FAMILY MEDICINE
Payer: COMMERCIAL

## 2017-04-14 ENCOUNTER — OFFICE VISIT (OUTPATIENT)
Dept: ORTHOPEDICS | Facility: CLINIC | Age: 57
End: 2017-04-14
Payer: COMMERCIAL

## 2017-04-14 ENCOUNTER — THERAPY VISIT (OUTPATIENT)
Dept: CHIROPRACTIC MEDICINE | Facility: CLINIC | Age: 57
End: 2017-04-14

## 2017-04-14 VITALS
WEIGHT: 150 LBS | BODY MASS INDEX: 24.11 KG/M2 | DIASTOLIC BLOOD PRESSURE: 64 MMHG | HEIGHT: 66 IN | SYSTOLIC BLOOD PRESSURE: 122 MMHG

## 2017-04-14 DIAGNOSIS — M25.552 HIP PAIN, LEFT: ICD-10-CM

## 2017-04-14 DIAGNOSIS — M99.05 SOMATIC DYSFUNCTION OF PELVIS REGION: ICD-10-CM

## 2017-04-14 DIAGNOSIS — M76.892 HAMSTRING TENDINITIS OF LEFT THIGH: Primary | ICD-10-CM

## 2017-04-14 DIAGNOSIS — M79.10 MYALGIA: ICD-10-CM

## 2017-04-14 DIAGNOSIS — M76.899 HAMSTRING TENDINITIS AT ORIGIN: ICD-10-CM

## 2017-04-14 PROCEDURE — 73502 X-RAY EXAM HIP UNI 2-3 VIEWS: CPT

## 2017-04-14 PROCEDURE — 99204 OFFICE O/P NEW MOD 45 MIN: CPT | Performed by: FAMILY MEDICINE

## 2017-04-14 NOTE — MR AVS SNAPSHOT
After Visit Summary   4/14/2017    Marycruz Tran    MRN: [id number]           Patient Information     Date Of Birth          1960        Visit Information        Provider Department      4/14/2017 3:15 PM Forest Barroso DC [facility]        Today's Diagnoses     Hamstring tendinitis at origin        Hip pain, left        Myalgia        Somatic dysfunction of pelvis region           Follow-ups after your visit        Additional Services     ORTHO  REFERRAL       Already had appointment with Dr. Geller.                  Future tests that were ordered for you today     Open Future Orders        Priority Expected Expires Ordered    MR Pelvis w/o Contrast Routine  4/14/2018 4/14/2017            Who to contact     If you have questions or need follow up information about today's clinic visit or your schedule please contact [facility] directly at [phone].  Normal or non-critical lab and imaging results will be communicated to you by Sunlothart, letter or phone within 4 business days after the clinic has received the results. If you do not hear from us within 7 days, please contact the clinic through Hotalott or phone. If you have a critical or abnormal lab result, we will notify you by phone as soon as possible.  Submit refill requests through Advanced Electron Beams or call your pharmacy and they will forward the refill request to us. Please allow 3 business days for your refill to be completed.          Additional Information About Your Visit        MyChart Information     Advanced Electron Beams gives you secure access to your electronic health record. If you see a primary care provider, you can also send messages to your care team and make appointments. If you have questions, please call your primary care clinic.  If you do not have a primary care provider, please call [phone] and they will assist you.         Care EveryWhere ID     This is your Care EveryWhere ID. This could be used by other organizations to access your [facility] medical records  [id number]         Blood Pressure from Last 3 Encounters:   04/14/17 122/64   12/06/16 120/80   10/03/16 104/75    Weight from Last 3 Encounters:   04/14/17 68 kg (150 lb)   12/06/16 68.4 kg (150 lb 12.8 oz)   10/03/16 63.5 kg (140 lb)              We Performed the Following     ORTHO  REFERRAL        Primary Care Provider Office Phone # Fax #    Fabienne Haley [phone]       [facility] WOMEN [address] [facility] [address]  [facility] [address]        Thank you!     Thank you for choosing [facility]  [facility] your care. Our goal is always to provide you with excellent care. Hearing back from our patients is one way we can continue to improve our services. Please take a few minutes to complete the written survey that you may receive in the mail after your visit with us. Thank you!             Your Updated Medication List - Protect others around you: Learn how to safely use, store and throw away your medicines at www.disposemymeds.org.          This list is accurate as of: 4/14/17 11:59 PM.  Always use your most recent med list.                   Brand Name Dispense Instructions for use    calcium carb-cholecalciferol 600-500 MG-UNIT Caps      Take 1 tablet by mouth 2 times daily       estradiol 0.1 MG/GM cream    ESTRACE VAGINAL    42.5 g    Place 1 g vaginally twice a week Place 1g vaginally nightly x 2 weeks and then twice weely for maintenance       IBUPROFEN PO      Take 400 mg by mouth every 4 hours as needed for moderate pain       Multi-vitamin Tabs tablet      Take 1 tablet by mouth daily

## 2017-04-14 NOTE — PATIENT INSTRUCTIONS
Assessment:  1. Hamstring tendinitis of left thigh    2. Hip pain, left        Plan:  MRI of the pelvis    Please call  [phone] to schedule your appointment if you have not heard from them in two business days  If you need to cancel or change the appointment please call [phone]   Please follow up in clinic 2 business days following the MRI / MRI Arthrogram

## 2017-04-14 NOTE — Clinical Note
Here is an update on your patient that was seen at [facility] in [place].   Please let us know if you have any questions.

## 2017-04-14 NOTE — PROGRESS NOTES
[facility]   Clinic Visit s Apr 14, 2017    PCP: Fabienne Haley      Marycruz is a 56 year old female who is seen in consultation at the request of Forest Barroso for   Chief Complaint   Patient presents with     Musculoskeletal Problem     L posterior hip pain     Injury: Patient reports insidious onset without acute precipitating event.    Location of Pain: left posterior hip  Duration of Pain: 4 month(s)  Rating of Pain at worst: 7/10  Rating of Pain Currently: 5/10  Pain is worse with: sitting, running, prolonged walking  Pain is better with: standing, nerve flossing  Treatment so far consists of: ibuprofen physical therapy 8 sessions, 4 sessions of manual therapy and dry needling  Associated symptoms: burning in buttock with burning or walking  Prior History of related problems: similar hip pain 6 years ago that resolved with resting from running and physical therapy     Social History: works as a      Past Medical History:   Diagnosis Date     Adenomatous colon polyp 10/2016    q5yrs     Decreased libido     tried testosterone 11/2012     Endometriosis      Infertility, female      Menopausal syndrome      Osteopenia of right thigh 2015     Sexual dysfunction     low libido -referred to Sariah Ku 11/2009       Patient Active Problem List    Diagnosis Date Noted     Hamstring tendinitis at origin 03/31/2017     Priority: Medium     Somatic dysfunction of pelvis region 02/28/2017     Priority: Medium     Hip pain, left 02/28/2017     Priority: Medium     Myalgia 02/28/2017     Priority: Medium     Osteopenia of right thigh      Priority: Medium     (2015) spine +0.0, R hip -2.3, L hip -0.2       Adenomatous colon polyp 10/01/2016     Priority: Medium     q5yrs       Family history of osteoporosis 12/03/2015     Priority: Medium     Decreased libido      Priority: Medium     tried testosterone 11/2012         Family History   Problem Relation Age of  "Onset     Cancer - colorectal Father 68     Breast Cancer Mother 75     Hypertension Mother      OSTEOPOROSIS Mother        Social History     Social History     Marital status:      Spouse name: N/A     Number of children: 1     Years of education: N/A     Occupational History     Self employed      Marycruz Tran [employer]     Social History Main Topics     Smoking status: Former Smoker     Smokeless tobacco: Never Used     Alcohol use 0.0 oz/week     0 Standard drinks or equivalent per week      Comment: 10 week     Drug use: No       Past Surgical History:   Procedure Laterality Date     COLONOSCOPY  12/5/2011    Procedure:COLONOSCOPY; COLONOSCOPY; Surgeon:YINA BAUTISTA; Location: GI     CONIZATION  1990     ENDOMETRIAL SAMPLING (BIOPSY)  2001, 2005 2005->DUB; inactive/weakly proliferative endometrium         2001-->benign     ENDOSCOPIC RELEASE CARPAL TUNNEL Left 1/8/2016    Procedure: ENDOSCOPIC RELEASE CARPAL TUNNEL;  Surgeon: Pratibha Beavers MD;  Location:  OR     LAPAROSCOPY DIAGNOSTIC (GYN)  9/1993    Stage I endometriosis     OPEN REDUCTION INTERNAL FIXATION WRIST Left 1/8/2016    Procedure: OPEN REDUCTION INTERNAL FIXATION WRIST;  Surgeon: Pratibha Beavers MD;  Location:  OR       Review of Systems   Musculoskeletal: Positive for joint pain.   All other systems reviewed and are negative.        Physical Exam  /64 (BP Location: Left arm, Patient Position: Chair, Cuff Size: Adult Regular)  Ht 5' 6\" (1.676 m)  Wt 150 lb (68 kg)  BMI 24.21 kg/m2  Constitutional:well-developed, well-nourished, and in no distress.   Cardiovascular: Intact distal pulses.    Neurological: alert. Gait Normal:   Gait, station, stance, and balance appear normal for age  Skin: Skin is warm and dry.   Psychiatric: Mood and affect normal.   Respiratory: unlabored, speaks in full sentences  Lymph: no LAD, no lymphangitis      Left Hip Exam   Gait: Normal.    Tenderness "   The patient is experiencing tenderness in the ischial tuberosity.    Range of Motion   Extension:            Normal  Flexion:                 Normal  Internal Rotation:  Normal  External Rotation: Normal  Abduction:            Normal  Adduction:            Normal    Muscle Strength   Abduction:  5/5  Adduction:  5/5  Flexion:      5/5    Tests   Cathy: Negative  Juan A:  N/t    Comments:  Pain, cramping with resisted hamstring flexion    Right Hip Exam   Gait: Normal.    Tenderness   None    Range of Motion   Extension:            Normal  Flexion:                 Normal  Internal Rotation:  Normal  External Rotation: Normal  Abduction:            Normal  Adduction:            Normal    Muscle Strength   Abduction:  5/5  Adduction:  5/5  Flexion:      5/5    Tests   Cathy: Negative  Juan A:  N/t    Back Exam     Tenderness   None    Range of Motion   Normal back ROM  SLR    Right: Negative  Left:    + at 90 deg    Muscle Strength   Normal back strength    Comments:  Slightly increased HS pain radiating down to LEFT calf at 90 deg          X-ray images Ordered and independently reviewed by me in the office today with the patient. X-ray shows: normal pelvis, with small calcification of LEFT ischial tuberosity.    ASSESSMENT/PLAN    ICD-10-CM    1. Hamstring tendinitis of left thigh M76.892 MR Pelvis w/o Contrast   2. Hip pain, left M25.552 XR Pelvis and Hip Left 1 View     MR Pelvis w/o Contrast     Pain localizes to LEFT ischial tuberosity with discomfort on hamstring strength testing. Unable to provoke with spine testing. Will proceed with MRI for further evalution; pt eager to proceed.    MRI ordered, Marycruz instructed to return at least 2 days following MRI to review results and determine treatment plan

## 2017-04-14 NOTE — MR AVS SNAPSHOT
After Visit Summary   4/14/2017    Marycruz Tran    MRN: [id number]           Patient Information     Date Of Birth          1960        Visit Information        Provider Department      4/14/2017 3:40 PM Harpreet Geller MD [facility]        Today's Diagnoses     Hamstring tendinitis of left thigh    -  1    Hip pain, left          Care Instructions    Assessment:  1. Hamstring tendinitis of left thigh    2. Hip pain, left        Plan:  MRI of the pelvis    Please call  [phone] to schedule your appointment if you have not heard from them in two business days  If you need to cancel or change the appointment please call [phone]   Please follow up in clinic 2 business days following the MRI / MRI Arthrogram           Follow-ups after your visit        Follow-up notes from your care team     Return for MRI results/further treatment plan as necessary.      Future tests that were ordered for you today     Open Future Orders        Priority Expected Expires Ordered    MR Pelvis w/o Contrast Routine  4/14/2018 4/14/2017            Who to contact     If you have questions or need follow up information about today's clinic visit or your schedule please contact [facility] directly at [phone].  Normal or non-critical lab and imaging results will be communicated to you by MyChart, letter or phone within 4 business days after the clinic has received the results. If you do not hear from us within 7 days, please contact the clinic through Promoboxxhart or phone. If you have a critical or abnormal lab result, we will notify you by phone as soon as possible.  Submit refill requests through LFS [employer]) or call your pharmacy and they will forward the refill request to us. Please allow 3 business days for your refill to be completed.          Additional Information About Your Visit        Promoboxxhart  "Information     Brandt gives you secure access to your electronic health record. If you see a primary care provider, you can also send messages to your care team and make appointments. If you have questions, please call your primary care clinic.  If you do not have a primary care provider, please call [phone] and they will assist you.        Care EveryWhere ID     This is your Care EveryWhere ID. This could be used by other organizations to access your [facility] medical records  [id number]        Your Vitals Were     Height BMI (Body Mass Index)                5' 6\" (1.676 m) 24.21 kg/m2           Blood Pressure from Last 3 Encounters:   04/14/17 122/64   12/06/16 120/80   10/03/16 104/75    Weight from Last 3 Encounters:   04/14/17 150 lb (68 kg)   12/06/16 150 lb 12.8 oz (68.4 kg)   10/03/16 140 lb (63.5 kg)               Primary Care Provider Office Phone # Fax #    Fabienne Haley [phone]       [facility] [address] [facility] [address]  [facility] [address]        Thank you!     Thank you for choosing [facility]  for your care. Our goal is always to provide you with excellent care. Hearing back from our patients is one way we can continue to improve our services. Please take a few minutes to complete the written survey that you may receive in the mail after your visit with us. Thank you!             Your Updated Medication List - Protect others around you: Learn how to safely use, store and throw away your medicines at www.disposemymeds.org.          This list is accurate as of: 4/14/17  4:22 PM.  Always use your most recent med list.                   Brand Name Dispense Instructions for use    calcium carb-cholecalciferol 600-500 MG-UNIT Caps      Take 1 tablet by mouth 2 times daily       estradiol 0.1 MG/GM cream    ESTRACE VAGINAL    42.5 g    Place 1 g vaginally twice a week Place 1g vaginally nightly x 2 weeks and then twice " brenda for maintenance       IBUPROFEN PO      Take 400 mg by mouth every 4 hours as needed for moderate pain       Multi-vitamin Tabs tablet      Take 1 tablet by mouth daily

## 2017-04-14 NOTE — NURSING NOTE
"Chief Complaint   Patient presents with     Musculoskeletal Problem     L posterior hip pain       Initial /64 (BP Location: Left arm, Patient Position: Chair, Cuff Size: Adult Regular)  Ht 5' 6\" (1.676 m)  Wt 150 lb (68 kg)  BMI 24.21 kg/m2 Estimated body mass index is 24.21 kg/(m^2) as calculated from the following:    Height as of this encounter: 5' 6\" (1.676 m).    Weight as of this encounter: 150 lb (68 kg).  Medication Reconciliation: complete     Abdelrahman Leal ATC      "

## 2017-04-15 NOTE — PROGRESS NOTES
Comes in today noting she is better from when started but still having pain sitting over proximal hamstring and posterior hip. We talked about seeing MD for imaging and she agreed. I referred her to Dr. Geller. I did not treat her today and instead just switched her to their team. We will follow up after exam. She was pleased with visit.

## 2017-04-18 ENCOUNTER — HOSPITAL ENCOUNTER (OUTPATIENT)
Dept: MRI IMAGING | Facility: CLINIC | Age: 57
Discharge: HOME | End: 2017-04-18
Attending: FAMILY MEDICINE | Admitting: FAMILY MEDICINE
Payer: COMMERCIAL

## 2017-04-18 DIAGNOSIS — M25.552 HIP PAIN, LEFT: ICD-10-CM

## 2017-04-18 DIAGNOSIS — M76.892 HAMSTRING TENDINITIS OF LEFT THIGH: ICD-10-CM

## 2017-04-18 PROCEDURE — 72195 MRI PELVIS W/O DYE: CPT

## 2017-04-20 ENCOUNTER — OFFICE VISIT (OUTPATIENT)
Dept: ORTHOPEDICS | Facility: CLINIC | Age: 57
End: 2017-04-20
Payer: COMMERCIAL

## 2017-04-20 VITALS — HEIGHT: 66 IN | WEIGHT: 150 LBS | BODY MASS INDEX: 24.11 KG/M2

## 2017-04-20 DIAGNOSIS — M76.892 HAMSTRING TENDINITIS OF LEFT THIGH: Primary | ICD-10-CM

## 2017-04-20 PROCEDURE — 99213 OFFICE O/P EST LOW 20 MIN: CPT | Performed by: FAMILY MEDICINE

## 2017-04-20 NOTE — MR AVS SNAPSHOT
After Visit Summary   4/20/2017    Marycruz Tran    MRN: [id number]           Patient Information     Date Of Birth          1960        Visit Information        Provider Department      4/20/2017 4:20 PM Harpreet Geller MD [facility] [place] [facility]        Today's Diagnoses     Hamstring tendinitis of left thigh    -  1       Follow-ups after your visit        Your next 10 appointments already scheduled     Apr 25, 2017  4:00 PM CDT   Return Visit with Harpreet Geller MD   [facility] [place] [facility] ([facility] [place])    [address] [facility] , [address] [place] [address] [phone]              Who to contact     If you have questions or need follow up information about today's clinic visit or your schedule please contact [facility] directly at [phone].  Normal or non-critical lab and imaging results will be communicated to you by BillGuardhart, letter or phone within 4 business days after the clinic has received the results. If you do not hear from us within 7 days, please contact the clinic through IN[employer] or phone. If you have a critical or abnormal lab result, we will notify you by phone as soon as possible.  Submit refill requests through Fandium or call your pharmacy and they will forward the refill request to us. Please allow 3 business days for your refill to be completed.          Additional Information About Your Visit        BillGuardhart Information     Fandium gives you secure access to your electronic health record. If you see a primary care provider, you can also send messages to your care team and make appointments. If you have questions, please call your primary care clinic.  If you do not have a primary care provider, please call [phone] and they will assist you.        Care EveryWhere ID     This is your Care  "EveryWhere ID. This could be used by other organizations to access your [facility] medical records  [id number]        Your Vitals Were     Height BMI (Body Mass Index)                5' 6\" (1.676 m) 24.21 kg/m2           Blood Pressure from Last 3 Encounters:   04/14/17 122/64   12/06/16 120/80   10/03/16 104/75    Weight from Last 3 Encounters:   04/20/17 150 lb (68 kg)   04/14/17 150 lb (68 kg)   12/06/16 150 lb 12.8 oz (68.4 kg)              Today, you had the following     No orders found for display       Primary Care Provider Office Phone # Fax #    Fabienne Haley [phone]       [facility] [address] [facility] [address]  [facility] [address]        Thank you!     Thank you for choosing [facility]  for your care. Our goal is always to provide you with excellent care. Hearing back from our patients is one way we can continue to improve our services. Please take a few minutes to complete the written survey that you may receive in the mail after your visit with us. Thank you!             Your Updated Medication List - Protect others around you: Learn how to safely use, store and throw away your medicines at www.disposemymeds.org.          This list is accurate as of: 4/20/17 10:40 PM.  Always use your most recent med list.                   Brand Name Dispense Instructions for use    calcium carb-cholecalciferol 600-500 MG-UNIT Caps      Take 1 tablet by mouth 2 times daily       estradiol 0.1 MG/GM cream    ESTRACE VAGINAL    42.5 g    Place 1 g vaginally twice a week Place 1g vaginally nightly x 2 weeks and then twice weely for maintenance       IBUPROFEN PO      Take 400 mg by mouth every 4 hours as needed for moderate pain       Multi-vitamin Tabs tablet      Take 1 tablet by mouth daily         "

## 2017-04-20 NOTE — PROGRESS NOTES
[facility]   Follow-up Visit s Apr 20, 2017    PCP: Fabienne Haley      Subjective:  Marycruz is a 56 year old female who is seen in follow up for evaluation of   Chief Complaint   Patient presents with     RECHECK     MRI results- L proximal hamstring     Her last visit was on 4/14/2017.  Since that time, symptoms have been unchanged. She is returning to discuss MRI results and treatment options      Patient's past medical, surgical, social and family histories are reviewed today.      History from previous visit on 4/14/2017    Injury: Patient reports insidious onset without acute precipitating event.    Location of Pain: left posterior hip  Duration of Pain: 4 month(s)  Rating of Pain at worst: 7/10  Rating of Pain Currently: 5/10  Pain is worse with: sitting, running, prolonged walking  Pain is better with: standing, nerve flossing  Treatment so far consists of: ibuprofen physical therapy 8 sessions, 4 sessions of manual therapy and dry needling  Associated symptoms: burning in buttock with burning or walking  Prior History of related problems: similar hip pain 6 years ago that resolved with resting from running and physical therapy     Social History: works as a      Past Medical History:   Diagnosis Date     Adenomatous colon polyp 10/2016    q5yrs     Decreased libido     tried testosterone 11/2012     Endometriosis      Infertility, female      Menopausal syndrome      Osteopenia of right thigh 2015     Sexual dysfunction     low libido -referred to Sariah Ku 11/2009       Patient Active Problem List    Diagnosis Date Noted     Hamstring tendinitis of left thigh 04/14/2017     Priority: Medium     Hamstring tendinitis at origin 03/31/2017     Priority: Medium     Somatic dysfunction of pelvis region 02/28/2017     Priority: Medium     Hip pain, left 02/28/2017     Priority: Medium     Myalgia 02/28/2017     Priority: Medium     Osteopenia of  "right thigh      Priority: Medium     (2015) spine +0.0, R hip -2.3, L hip -0.2       Adenomatous colon polyp 10/01/2016     Priority: Medium     q5yrs       Family history of osteoporosis 12/03/2015     Priority: Medium     Decreased libido      Priority: Medium     tried testosterone 11/2012         Family History   Problem Relation Age of Onset     Cancer - colorectal Father 68     Breast Cancer Mother 75     Hypertension Mother      OSTEOPOROSIS Mother        Social History     Social History     Marital status:      Spouse name: N/A     Number of children: 1     Years of education: N/A     Occupational History     Self employed      [employer]     Social History Main Topics     Smoking status: Former Smoker     Smokeless tobacco: Never Used     Alcohol use 0.0 oz/week     0 Standard drinks or equivalent per week      Comment: 10 week     Drug use: No       Past Surgical History:   Procedure Laterality Date     COLONOSCOPY  12/5/2011    Procedure:COLONOSCOPY; COLONOSCOPY; Surgeon:YINA BAUTISTA; Location: GI     CONIZATION  1990     ENDOMETRIAL SAMPLING (BIOPSY)  2001, 2005 2005->DUB; inactive/weakly proliferative endometrium         2001-->benign     ENDOSCOPIC RELEASE CARPAL TUNNEL Left 1/8/2016    Procedure: ENDOSCOPIC RELEASE CARPAL TUNNEL;  Surgeon: Pratibha Beavers MD;  Location:  OR     LAPAROSCOPY DIAGNOSTIC (GYN)  9/1993    Stage I endometriosis     OPEN REDUCTION INTERNAL FIXATION WRIST Left 1/8/2016    Procedure: OPEN REDUCTION INTERNAL FIXATION WRIST;  Surgeon: Pratibha Beavers MD;  Location:  OR       Review of Systems   Musculoskeletal: Positive for joint pain.   All other systems reviewed and are negative.        Physical Exam  Ht 5' 6\" (1.676 m)  Wt 150 lb (68 kg)  BMI 24.21 kg/m2  Constitutional:well-developed, well-nourished, and in no distress.   Cardiovascular: Intact distal pulses.    Neurological: alert. Gait Normal:   " Gait, station, stance, and balance appear normal for age  Skin: Skin is warm and dry.   Psychiatric: Mood and affect normal.   Respiratory: unlabored, speaks in full sentences  Lymph: no LAD, no lymphangitis      Left Hip Exam   Gait: Normal.    Tenderness   The patient is experiencing tenderness in the ischial tuberosity.    Range of Motion   Extension:            Normal  Flexion:                 Normal  Internal Rotation:  Normal  External Rotation: Normal  Abduction:            Normal  Adduction:            Normal    Muscle Strength   Abduction:  5/5  Adduction:  5/5  Flexion:      5/5    Tests   Cathy: Negative  Juan A:  N/t    Comments:  Pain, cramping with resisted hamstring flexion    Right Hip Exam   Gait: Normal.    Tenderness   None    Range of Motion   Extension:            Normal  Flexion:                 Normal  Internal Rotation:  Normal  External Rotation: Normal  Abduction:            Normal  Adduction:            Normal    Muscle Strength   Abduction:  5/5  Adduction:  5/5  Flexion:      5/5    Tests   Cathy: Negative  Juan A:  N/t    Back Exam     Tenderness   None    Range of Motion   Normal back ROM  SLR    Right: Negative  Left:    + at 90 deg    Muscle Strength   Normal back strength    Comments:  Slightly increased HS pain radiating down to LEFT calf at 90 deg        MR Pelvis w/o Contrast    Narrative    MR PELVIS WITHOUT CONTRAST  4/18/2017 5:12 PM    HISTORY:  Evaluate proximal hamstring pain. Other specified  enthesopathies of left lower limb, excluding foot. Pain in left hip.    TECHNIQUE:  Coronal T1 and inversion recovery, sagittal T1, and  transverse proton density and fat suppressed T2 weighted images.    FINDINGS:   Osseous and Cartilaginous Structures:  No fracture or destructive bone  lesion.  No femoral head osteonecrosis.  No significant hip  osteoarthritis or apparent chondromalacia.       Acetabular Labrum: No juxtaacetabular cyst.  No obvious labral tear is  appreciated, allowing  for the large FOV technique.  If indicated  clinically, MR arthrography would be considered the study of choice in  this regard.    Common Hamstring Tendon: There may be mild tendinosis of the common  hamstring tendons at the ischial tuberosity attachment. However, this  appears to be relatively symmetrical. No krystina tear or tendon rupture  is demonstrated.    Gluteal Tendon Greater Trochanteric Attachments: The gluteus medius  and minimus tendons appear within normal limits and symmetrical.    Trochanteric and Iliopsoas Bursae: No fluid collection in the  trochanteric and iliopsoas bursae.    Joint space: No joint effusion.     Additional findings: There is a small amount of free peritoneal fluid  in the cul-de-sac. Apophyseal joint degenerative arthrosis is noted at  L4-L5 without periarticular reactive marrow edema. However, this scan  is not tailored to optimally evaluate the lumbar spine.      Impression    IMPRESSION:   1. Probable mild bilateral common hamstring tendinosis at the ischial  tuberosity attachments. This appears relatively symmetrical. No krystina  tear or tendon rupture is demonstrated.  2. L4-L5 apophyseal joint degenerative arthrosis without periarticular  reactive marrow edema.  3. Nonspecific small amount of free peritoneal fluid within the  cul-de-sac.    ROSARIO SOLIS MD         ASSESSMENT/PLAN    ICD-10-CM    1. Hamstring tendinitis of left thigh M76.892        bilateral HS tendinosis, symptomatic only on LEFT .  Discussed treatment options of these degenerative not inflammatory lesions. Noted that anti-inflammatory medications may relieve pain somewhat but do little to improve the injured tissue.  Treatment geared toward rebuilding the injured tissue is the most promising treatment available. This may include rehab emphasizing eccentric exercises, friction protocols such as ASTYM, and irritant therapy protocols such as prolotherapy and autologous blood injections.  Educated on eccentric  strengthening   May call for autologous blood injection if desired.

## 2017-04-25 ENCOUNTER — OFFICE VISIT (OUTPATIENT)
Dept: ORTHOPEDICS | Facility: CLINIC | Age: 57
End: 2017-04-25

## 2017-04-25 VITALS — HEIGHT: 66 IN | WEIGHT: 150 LBS | BODY MASS INDEX: 24.11 KG/M2

## 2017-04-25 DIAGNOSIS — M76.892 HAMSTRING TENDINITIS OF LEFT THIGH: Primary | ICD-10-CM

## 2017-04-25 PROCEDURE — 20999 UNLISTED PX MUSCSKEL GENERAL: CPT | Performed by: FAMILY MEDICINE

## 2017-04-25 NOTE — MR AVS SNAPSHOT
After Visit Summary   4/25/2017    Marycruz Tran    MRN: [id number]           Patient Information     Date Of Birth          1960        Visit Information        Provider Department      4/25/2017 4:00 PM Harpreet Geller MD [facility]        Today's Diagnoses     Hamstring tendinitis of left thigh    -  1      Care Instructions    Assessment:  1. Hamstring tendinitis of left thigh        Plan:  Prolotherapy injection of the left proximal hamstring    Avoid excessive activity for the next 3-4 days, limit activity to walking only during that time.     May gradually return to activity as pain allows thereafter    Avoid use of NSAIDs for the next 7 days    Injection could be repeated after 6 weeks if showing partial but not full pain relief at that time    Call if developing any signs or concern for infection          Follow-ups after your visit        Follow-up notes from your care team     Return if symptoms worsen or fail to improve.      Who to contact     If you have questions or need follow up information about today's clinic visit or your schedule please contact [facility] directly at [phone].  Normal or non-critical lab and imaging results will be communicated to you by [employer], letter or phone within 4 business days after the clinic has received the results. If you do not hear from us within 7 days, please contact the clinic through [employer] or phone. If you have a critical or abnormal lab result, we will notify you by phone as soon as possible.  Submit refill requests through [email or web address] or call your pharmacy and they will forward the refill request to us. Please allow 3 business days for your refill to be completed.          Additional Information About Your Visit        [employer] Information     [email or web address] gives you secure access to your electronic health record. If you see a primary  "care provider, you can also send messages to your care team and make appointments. If you have questions, please call your primary care clinic.  If you do not have a primary care provider, please call [phone] and they will assist you.        Care EveryWhere ID     This is your Care EveryWhere ID. This could be used by other organizations to access your [place] medical records  [id number]        Your Vitals Were     Height BMI (Body Mass Index)                5' 6\" (1.676 m) 24.21 kg/m2           Blood Pressure from Last 3 Encounters:   04/14/17 122/64   12/06/16 120/80   10/03/16 104/75    Weight from Last 3 Encounters:   04/25/17 150 lb (68 kg)   04/20/17 150 lb (68 kg)   04/14/17 150 lb (68 kg)              We Performed the Following     C INJ(S), WHOLE BLOOD, WITH IMAGE GUIDANCE        Primary Care Provider Office Phone # Fax #    Fabienne Haley [phone]       [facility] [address]  [facility] [address]        Thank you!     Thank you for choosing [facility]  for your care. Our goal is always to provide you with excellent care. Hearing back from our patients is one way we can continue to improve our services. Please take a few minutes to complete the written survey that you may receive in the mail after your visit with us. Thank you!             Your Updated Medication List - Protect others around you: Learn how to safely use, store and throw away your medicines at www.disposemymeds.org.          This list is accurate as of: 4/25/17 11:59 PM.  Always use your most recent med list.                   Brand Name Dispense Instructions for use    calcium carb-cholecalciferol 600-500 MG-UNIT Caps      Take 1 tablet by mouth 2 times daily       estradiol 0.1 MG/GM cream    ESTRACE VAGINAL    42.5 g    Place 1 g vaginally twice a week Place 1g vaginally nightly x 2 weeks and then twice weely for maintenance       " IBUPROFEN PO      Take 400 mg by mouth every 4 hours as needed for moderate pain       Multi-vitamin Tabs tablet      Take 1 tablet by mouth daily

## 2017-04-25 NOTE — PROGRESS NOTES
[facility]   Follow-up Visit s Apr 25, 2017    PCP: Fabienne Haley      Subjective:  Marycruz is a 56 year old female who is seen in follow up for evaluation of   Chief Complaint   Patient presents with     RECHECK     prolotherapy for L proximal hamstring     Her last visit was on 4/20/2017. She is following up today for prolotherapy of the left proximal hamstring.     Patient's past medical, surgical, social and family histories are reviewed today.    Social History: works as a      Past Medical History:   Diagnosis Date     Adenomatous colon polyp 10/2016    q5yrs     Decreased libido     tried testosterone 11/2012     Endometriosis      Infertility, female      Menopausal syndrome      Osteopenia of right thigh 2015     Sexual dysfunction     low libido -referred to Sariah Ku 11/2009       Patient Active Problem List    Diagnosis Date Noted     Hamstring tendinitis of left thigh 04/14/2017     Priority: Medium     Hamstring tendinitis at origin 03/31/2017     Priority: Medium     Somatic dysfunction of pelvis region 02/28/2017     Priority: Medium     Hip pain, left 02/28/2017     Priority: Medium     Myalgia 02/28/2017     Priority: Medium     Osteopenia of right thigh      Priority: Medium     (2015) spine +0.0, R hip -2.3, L hip -0.2       Adenomatous colon polyp 10/01/2016     Priority: Medium     q5yrs       Family history of osteoporosis 12/03/2015     Priority: Medium     Decreased libido      Priority: Medium     tried testosterone 11/2012         Family History   Problem Relation Age of Onset     Cancer - colorectal Father 68     Breast Cancer Mother 75     Hypertension Mother      OSTEOPOROSIS Mother        Social History     Social History     Marital status:      Spouse name: N/A     Number of children: 1     Years of education: N/A     Occupational History     Self employed      Marycruz [employer]      Social History  "Main Topics     Smoking status: Former Smoker     Smokeless tobacco: Never Used     Alcohol use 0.0 oz/week     0 Standard drinks or equivalent per week      Comment: 10 week     Drug use: No       Past Surgical History:   Procedure Laterality Date     COLONOSCOPY  12/5/2011    Procedure:COLONOSCOPY; COLONOSCOPY; Surgeon:YINA BAUTISTA; Location: GI     CONIZATION  1990     ENDOMETRIAL SAMPLING (BIOPSY)  2001, 2005 2005->DUB; inactive/weakly proliferative endometrium         2001-->benign     ENDOSCOPIC RELEASE CARPAL TUNNEL Left 1/8/2016    Procedure: ENDOSCOPIC RELEASE CARPAL TUNNEL;  Surgeon: Pratibha Beavers MD;  Location:  OR     LAPAROSCOPY DIAGNOSTIC (GYN)  9/1993    Stage I endometriosis     OPEN REDUCTION INTERNAL FIXATION WRIST Left 1/8/2016    Procedure: OPEN REDUCTION INTERNAL FIXATION WRIST;  Surgeon: Pratibha Beavers MD;  Location:  OR       Review of Systems   Musculoskeletal: Positive for joint pain.   All other systems reviewed and are negative.        Physical Exam  Ht 5' 6\" (1.676 m)  Wt 150 lb (68 kg)  BMI 24.21 kg/m2  Constitutional:well-developed, well-nourished, and in no distress.   Cardiovascular: Intact distal pulses.    Neurological: alert. Gait Normal:   Gait, station, stance, and balance appear normal for age  Skin: Skin is warm and dry.   Psychiatric: Mood and affect normal.   Respiratory: unlabored, speaks in full sentences  Lymph: no LAD, no lymphangitis      Left Hip Exam     Tenderness   The patient is experiencing tenderness in the ischial tuberosity.    Range of Motion   Extension:            Normal  Flexion:                 Normal  Internal Rotation:  Normal  External Rotation: Normal  Abduction:            Normal  Adduction:            Normal    Muscle Strength   Abduction:  5/5  Adduction:  5/5  Flexion:      5/5    Tests   Cathy: Negative  Juan A:  N/t    Comments:  Pain, cramping with resisted hamstring flexion        MR Pelvis w/o " Contrast    Narrative    MR PELVIS WITHOUT CONTRAST  4/18/2017 5:12 PM    HISTORY:  Evaluate proximal hamstring pain. Other specified  enthesopathies of left lower limb, excluding foot. Pain in left hip.    TECHNIQUE:  Coronal T1 and inversion recovery, sagittal T1, and  transverse proton density and fat suppressed T2 weighted images.    FINDINGS:   Osseous and Cartilaginous Structures:  No fracture or destructive bone  lesion.  No femoral head osteonecrosis.  No significant hip  osteoarthritis or apparent chondromalacia.       Acetabular Labrum: No juxtaacetabular cyst.  No obvious labral tear is  appreciated, allowing for the large FOV technique.  If indicated  clinically, MR arthrography would be considered the study of choice in  this regard.    Common Hamstring Tendon: There may be mild tendinosis of the common  hamstring tendons at the ischial tuberosity attachment. However, this  appears to be relatively symmetrical. No krystina tear or tendon rupture  is demonstrated.    Gluteal Tendon Greater Trochanteric Attachments: The gluteus medius  and minimus tendons appear within normal limits and symmetrical.    Trochanteric and Iliopsoas Bursae: No fluid collection in the  trochanteric and iliopsoas bursae.    Joint space: No joint effusion.     Additional findings: There is a small amount of free peritoneal fluid  in the cul-de-sac. Apophyseal joint degenerative arthrosis is noted at  L4-L5 without periarticular reactive marrow edema. However, this scan  is not tailored to optimally evaluate the lumbar spine.      Impression    IMPRESSION:   1. Probable mild bilateral common hamstring tendinosis at the ischial  tuberosity attachments. This appears relatively symmetrical. No krystina  tear or tendon rupture is demonstrated.  2. L4-L5 apophyseal joint degenerative arthrosis without periarticular  reactive marrow edema.  3. Nonspecific small amount of free peritoneal fluid within the  cul-de-sac.    ROSARIO SOLIS MD          ASSESSMENT/PLAN    ICD-10-CM    1. Hamstring tendinitis of left thigh M76.892 C INJ(S), WHOLE BLOOD, WITH IMAGE GUIDANCE       Left Hip proximal hamstring autologous blood prolotherapy  Injection     Diagnoses (preoperative and postoperative):  left Hamstring tendinitis of left thigh  (primary encounter diagnosis)  Current Procedure (include preoperative):  Sonographically guided left  hamstring proximal tendon prolotherapy injection  Current Indication (include preoperative):  Alleviation of pain  REFERRED BY:  Dr. Barroso  REASON FOR PROCEDURE: . Sonographic guidance will be used to ensure accurate placement of the medication within the joint space and avoid nearby neurovascular structures.  PATIENT EDUCATION:  Ready to learn with no apparent learning barriers identified.  Learning preferences include listening. Explained diagnosis and treatment plan as well as treatment alternatives. Patient expressed understanding of the content.  Following denial of allergy and review of potential side effects and complications including but not necessarily limited to infection, bleeding, allergic reaction, post-injection flare, local tissue breakdown (including but not limited to potential for skin depigmentation and/or subcutaneous fat atrophy),  injury to soft tissue and/or nerves and seizure, patient indicated their understanding and agreed to proceed. Written and signed consent was obtained and is scanned into the chart.  PROCEDURE: Prior to the procedure, the left posterior hip joint was examined with a 4 MHz curvilinear transducer to visualize the hamstring tendon and determine the approach for the procedure.  Procedure was carried out using sterile technique including chloroprep, and a sterile transducer gel. A simple surgical tray was used.  PROCEDURAL PAUSE:  Procedural pause conducted to verify correct patient identity, procedure to be performed, and as applicable, correct side/site, correct patient  position.  Patient position: Supine  Transducer type: 4 MHz curvilinear  Approach: Lateral to medial parallel to long axis of transducer  Local Anesthesia: Sonographically guided 22-gauge 1.5 inch needle was used to anesthetize the skin and subcutaneous tissue with 5 ml of 1% Lidocaine  Aspirate: None    Prior to the procedure 5cc of autologous blood obtained from the RIGHT antecubital fossa without difficulty.   Injectate:  Sonographically guided 22-gauge 2.5 inch needle was directly visualized entering down into the left hamstring tendon and 5cc autologous blood was injected in and around the hamstring tendon.   AFTERCARE:  Patient tolerated the procedure without complication. After a short observation period, patient was discharged under their own power and in excellent condition.    Monitoring for infection was discussed.    gradual return to activity discussed. Repeat injection at 6-12 weeks an option if needed.

## 2017-07-24 ENCOUNTER — THERAPY VISIT (OUTPATIENT)
Dept: CHIROPRACTIC MEDICINE | Facility: CLINIC | Age: 57
End: 2017-07-24
Payer: COMMERCIAL

## 2017-07-24 DIAGNOSIS — M25.552 HIP PAIN, LEFT: ICD-10-CM

## 2017-07-24 DIAGNOSIS — M79.10 MYALGIA: ICD-10-CM

## 2017-07-24 DIAGNOSIS — M99.05 SOMATIC DYSFUNCTION OF PELVIS REGION: Primary | ICD-10-CM

## 2017-07-24 DIAGNOSIS — M76.899 HAMSTRING TENDINITIS AT ORIGIN: ICD-10-CM

## 2017-07-24 PROCEDURE — 98940 CHIROPRACT MANJ 1-2 REGIONS: CPT | Mod: AT | Performed by: CHIROPRACTOR

## 2017-07-24 PROCEDURE — 97110 THERAPEUTIC EXERCISES: CPT | Performed by: CHIROPRACTOR

## 2017-07-24 NOTE — MR AVS SNAPSHOT
After Visit Summary   7/24/2017    Marycruz Tran    MRN: [id number]           Patient Information     Date Of Birth          1960        Visit Information        Provider Department      7/24/2017 8:00 AM Forest Barroso DC [facility] Athletic Medicine - [place] Chiropractor        Today's Diagnoses     Somatic dysfunction of pelvis region    -  1    Hamstring tendinitis at origin        Hip pain, left        Myalgia           Follow-ups after your visit        Your next 10 appointments already scheduled     Aug 08, 2017  1:45 PM CDT   [facility] Chiropractor with Forest Barroso DC   [facility] Athletic [facility] [place] Chiropractor (ANÍBAL [place])    [address] [place] [address]  [id number]  [place] [address] [phone]              Who to contact     If you have questions or need follow up information about today's clinic visit or your schedule please contact [facility]THLET[facility] [place] CHIROPRACTOR directly at [phone].  Normal or non-critical lab and imaging results will be communicated to you by DIRAmedhart, letter or phone within 4 business days after the clinic has received the results. If you do not hear from us within 7 days, please contact the clinic through [employer] or phone. If you have a critical or abnormal lab result, we will notify you by phone as soon as possible.  Submit refill requests through Smarter Agent Mobile or call your pharmacy and they will forward the refill request to us. Please allow 3 business days for your refill to be completed.          Additional Information About Your Visit        DIRAmedhart Information     Smar[employer] Mobile gives you secure access to your electronic health record. If you see a primary care provider, you can also send messages to your care team and make appointments. If you have questions, please call your primary care clinic.  If you do not have a primary care provider, please call [phone] and they will assist you.         Care EveryWhere ID     This is your Care EveryWhere ID. This could be used by other organizations to access your [place] medical records  [id number]         Blood Pressure from Last 3 Encounters:   04/14/17 122/64   12/06/16 120/80   10/03/16 104/75    Weight from Last 3 Encounters:   04/25/17 68 kg (150 lb)   04/20/17 68 kg (150 lb)   04/14/17 68 kg (150 lb)              We Performed the Following     CHIROPRAC MANIP,SPINAL,1-2 REGIONS     THERAPEUTIC EXERCISES        Primary Care Provider Office Phone # Fax #    Fabienne Haley [phone]       [facility] [address] [facility] [address]  [place] [address]        Equal Access to Services     ANDERSON HANCOCK : Hadii aad ku hadasho Soomaali, waaxda luqadaha, qaybta kaalmada adeegyada, waxay stanislav howe . So [facility] [phone].    ATENCIÓN: Si habla español, tiene a urban disposición servicios gratuitos de asistencia lingüística. [facility] [phone].    We comply with applicable federal civil rights laws and [place] laws. We do not discriminate on the basis of race, color, national origin, age, disability sex, sexual orientation or gender identity.            Thank you!     Thank you for choosing [facility] CHIROPRACTOR  for your care. Our goal is always to provide you with excellent care. Hearing back from our patients is one way we can continue to improve our services. Please take a few minutes to complete the written survey that you may receive in the mail after your visit with us. Thank you!             Your Updated Medication List - Protect others around you: Learn how to safely use, store and throw away your medicines at www.disposemymeds.org.          This list is accurate as of: 7/24/17 11:59 PM.  Always use your most recent med list.                   Brand Name Dispense Instructions for use Diagnosis    calcium carb-cholecalciferol 600-500 MG-UNIT Caps      Take 1 tablet  by mouth 2 times daily        estradiol 0.1 MG/GM cream    ESTRACE VAGINAL    42.5 g    Place 1 g vaginally twice a week Place 1g vaginally nightly x 2 weeks and then twice weely for maintenance    Vaginal atrophy       IBUPROFEN PO      Take 400 mg by mouth every 4 hours as needed for moderate pain        Multi-vitamin Tabs tablet      Take 1 tablet by mouth daily

## 2017-07-24 NOTE — PROGRESS NOTES
[facility]      Subjective:  Marycruz Tran   56 year old   female    CC: Left butt muscle pain and knee pain, referral from Radha PT  Medications reviewed: Denies any medication s  Visit: 5/6  Goal: sit for 30 minutes without pain, walk 30 minutes without pain, decrease pain 50%, run again    Location: L posterior hip   KAILA: Notes runner, 30 years, slow and steady according to her, notes previous history of this pain, notes few months ago started up again. Has not ran since 12/2016. Started seeing PT in 1/2017 weekly and notes that she has been working on piriformis syndrome and working on glute strength. Notes some progress. Notes also saw DC a couple times. Walking is going OK, but that has created pain in past.   Pain: varies but 3/10 on average, worse with sitting  Had prolotherapy and felt good in April. Was doing well and was returning back to running. Notes that about 1 month ago started to get sore with running. Doing more elliptical now as that is pain free. Notes pain is back to left posterior hip and proximal hamstring. We talked about options and she wanted to do sessions with me before getting another injection. Denies any other changes in health history since last visit. Will not bill exam as reports for same issues.       Objective:  Inspection:  No SDD  No Scars  Normal Gait    Palpation:  No specific pain  Palpable soreness over L posterior hip   Myofascitis 2/4 noted over L piriformis    ROM:  Lumbar flexion   90/90  Lumbar extension  30/30, mild lower back discomfort   Right Hip IR  38/45  Left Hip IR  29/45  Right Hip ER  42/60  Left  hip ER  39/60  Hip abduction limited on left 5 degrees with no pain  Hip adduction symmetric     MMT:  Left glute med 4/5 with no pain  Right glute med 5/5 with no pain  Left TFL 4/5 with no pain  Right TFL 5/5 with no pain  Left piriformis 5/5 with no pain  Right piriformis 5/5 with no pain    MET:  Right short leg  Right superior  pubic bone  Right hip out flare    Squat:  Shift to right  Foot flare to left  Arm drop on right  Left knee genu valgum     Lunge:  L knee genu valgum  L knee cross over     NAL:  Restricted SI on left side      Ortho:  SLR (tightness left hamstring), jazmin (posteiror hip pain on left), Fader (-), SI compression (-)     Assessment:  NAL with associated myofascitis and weakness  Hip pain, proximal hamstring tendinosis     Plan:    Patient tolerated treatment well today  Treatment Time: 45 minutes  54091 manipulation 1-2 segments: MET, Hip LAD  54353 Manual therapy: (ART, Graston, Strain Counter Strain, Fascial Manipulation, Cupping) performed over area of L hip ER's, L hamstring, L adductors   47416 therapeutic exercise (30 minutes):Review of the following: clams, side lying leg raises, single leg step, tried to do band walks, marching bridges, bridges with walks, flossing has helped in past, gave seated piriformis flossing, reverse clams with yellow band, single leg RDL's  Reviewed step downs, butterfly stretch, clams, marching  Bridges  Today: side lying hip rotation bottom leg clams, marching straight leg bridges with weight transfer  Strapping: hip IR and lateral glide on left  Taping:  Shock wave: 20/12, posterior hip, felt deep ache (did not do today)  Next visit: 2  weeks  Dry needle: 10 proximal hamstring (did not do today)  Other: talked about 3-4 sessions and if not responding would encourage another consult with Dr. Geller. She agreed to this.     Forest Barroso DC, MEd, ATC

## 2017-08-08 ENCOUNTER — THERAPY VISIT (OUTPATIENT)
Dept: CHIROPRACTIC MEDICINE | Facility: CLINIC | Age: 57
End: 2017-08-08
Payer: COMMERCIAL

## 2017-08-08 DIAGNOSIS — M99.05 SOMATIC DYSFUNCTION OF PELVIS REGION: Primary | ICD-10-CM

## 2017-08-08 DIAGNOSIS — M25.552 HIP PAIN, LEFT: ICD-10-CM

## 2017-08-08 DIAGNOSIS — M76.899 HAMSTRING TENDINITIS AT ORIGIN: ICD-10-CM

## 2017-08-08 DIAGNOSIS — M79.10 MYALGIA: ICD-10-CM

## 2017-08-08 PROCEDURE — 97110 THERAPEUTIC EXERCISES: CPT | Performed by: CHIROPRACTOR

## 2017-08-08 PROCEDURE — 98940 CHIROPRACT MANJ 1-2 REGIONS: CPT | Mod: AT | Performed by: CHIROPRACTOR

## 2017-08-08 NOTE — MR AVS SNAPSHOT
After Visit Summary   8/8/2017    Marycruz Tran    MRN: [id number]           Patient Information     Date Of Birth          1960        Visit Information        Provider Department      8/8/2017 1:45 PM Forest Barroso DC [place] [facility] - [place] Chiropractor        Today's Diagnoses     Somatic dysfunction of pelvis region    -  1    Hamstring tendinitis at origin        Hip pain, left        Myalgia           Follow-ups after your visit        Who to contact     If you have questions or need follow up information about today's clinic visit or your schedule please contact [place] [facility] - [place] CHIROPRACTOR directly at [phone].  Normal or non-critical lab and imaging results will be communicated to you by FiberSensinghart, letter or phone within 4 business days after the clinic has received the results. If you do not hear from us within 7 days, please contact the clinic through FiberSensinghart or phone. If you have a critical or abnormal lab result, we will notify you by phone as soon as possible.  Submit refill requests through On The Flea or call your pharmacy and they will forward the refill request to us. Please allow 3 business days for your refill to be completed.          Additional Information About Your Visit        MyChart Information     On The Flea gives you secure access to your electronic health record. If you see a primary care provider, you can also send messages to your care team and make appointments. If you have questions, please call your primary care clinic.  If you do not have a primary care provider, please call [phone] and they will assist you.        Care EveryWhere ID     This is your Care EveryWhere ID. This could be used by other organizations to access your [place] medical records  [id number]         Blood Pressure from Last 3 Encounters:   04/14/17 122/64   12/06/16 120/80   10/03/16 104/75    Weight from Last 3 Encounters:    04/25/17 68 kg (150 lb)   04/20/17 68 kg (150 lb)   04/14/17 68 kg (150 lb)              We Performed the Following     CHIROPRAC MANIP,SPINAL,1-2 REGIONS     THERAPEUTIC EXERCISES        Primary Care Provider Office Phone # Fax #    Fabienne Haley [phone] [address] [facility] [address]  [place] [address]        Equal Access to Services     TARA HANCOCK : Hadii aad ku hadasho Soomaali, waaxda luqadaha, qaybta kaalmada adeegyada, waxay idiin hayaan adeeg kharash lanathanieln . So [facility] [phone].    ATENCIÓN: Si denise wright, tiene a urban disposición servicios gratuitos de asistencia lingüística. Adrielame al [phone].    We comply with applicable federal civil rights laws and [place] laws. We do not discriminate on the basis of race, color, national origin, age, disability sex, sexual orientation or gender identity.            Thank you!     Thank you for choosing [place] [facility] CHIROPRACTOR  for your care. Our goal is always to provide you with excellent care. Hearing back from our patients is one way we can continue to improve our services. Please take a few minutes to complete the written survey that you may receive in the mail after your visit with us. Thank you!             Your Updated Medication List - Protect others around you: Learn how to safely use, store and throw away your medicines at www.disposemymeds.org.          This list is accurate as of: 8/8/17 10:08 PM.  Always use your most recent med list.                   Brand Name Dispense Instructions for use Diagnosis    calcium carb-cholecalciferol 600-500 MG-UNIT Caps      Take 1 tablet by mouth 2 times daily        estradiol 0.1 MG/GM cream    ESTRACE VAGINAL    42.5 g    Place 1 g vaginally twice a week Place 1g vaginally nightly x 2 weeks and then twice weely for maintenance    Vaginal atrophy       IBUPROFEN PO      Take 400 mg by mouth every 4 hours as needed for moderate pain         Multi-vitamin Tabs tablet      Take 1 tablet by mouth daily

## 2017-08-09 NOTE — PROGRESS NOTES
[place]      Subjective:  Marycruz Tran   56 year old   female    CC: Left butt muscle pain and knee pain, referral from Radha PT  Medications reviewed: Denies any medication s  Visit: 6  Goal: sit for 30 minutes without pain, walk 30 minutes without pain, decrease pain 50%, run again    Location: L posterior hip   KAILA: Notes runner, 30 years, slow and steady according to her, notes previous history of this pain, notes few months ago started up again. Has not ran since 12/2016. Started seeing PT in 1/2017 weekly and notes that she has been working on piriformis syndrome and working on glute strength. Notes some progress. Notes also saw DC a couple times. Walking is going OK, but that has created pain in past.   Pain: varies but 3/10 on average, worse with sitting  Comes in today dong well. Tolerated last session well. Was not sore. Has been biking pain free. Has some discomfort sitting. Has not ran yet. Working on active care. Denies any new issues.       Objective:  Inspection:  No SDD  No Scars  Normal Gait    Palpation:  No specific pain  Palpable soreness over L posterior hip   Myofascitis 2/4 noted over L piriformis    ROM:  Lumbar flexion   90/90  Lumbar extension  30/30, mild lower back discomfort   Right Hip IR  38/45  Left Hip IR  29/45  Right Hip ER  42/60  Left  hip ER  39/60  Hip abduction limited on left 5 degrees with no pain  Hip adduction symmetric     MMT:  Left glute med 4/5 with no pain  Right glute med 5/5 with no pain  Left TFL 4/5 with no pain  Right TFL 5/5 with no pain  Left piriformis 5/5 with no pain  Right piriformis 5/5 with no pain    MET:  Right short leg  Right superior pubic bone  Right hip out flare    Squat:  Shift to right  Foot flare to left  Arm drop on right  Left knee genu valgum     Lunge:  L knee genu valgum  L knee cross over     NAL:  Restricted SI on left side      Ortho:  SLR (tightness left hamstring), jazmin (posteiror hip pain on left),  Fader (-), SI compression (-)     Assessment:  NAL with associated myofascitis and weakness  Hip pain, proximal hamstring tendinosis     Plan:    Patient tolerated treatment well today  Treatment Time: 45 minutes  41376 manipulation 1-2 segments: MET, Hip LAD  29542 Manual therapy: (ART, Graston, Strain Counter Strain, Fascial Manipulation, Cupping) performed over area of L hip ER's, L hamstring, L adductors   35651 therapeutic exercise (30 minutes):Review of the following: clams, side lying leg raises, single leg step, tried to do band walks, marching bridges, bridges with walks, flossing has helped in past, gave seated piriformis flossing, reverse clams with yellow band, single leg RDL's  Reviewed step downs, butterfly stretch, clams, marching  Bridges  Today: side lying hip rotation bottom leg clams, marching straight leg bridges with weight transfer, added band capsules stretch and band PIR activation   Strapping: hip IR and lateral glide on left  Taping:  Shock wave: 20/12, posterior hip, felt deep ache (did not do today)  Next visit: 2  weeks  Dry needle: 10 proximal hamstring (did not do today)  Other: talked about 3-4 sessions and if not responding would encourage another consult with Dr. Geller. She agreed to this.     Forest Barroso DC, MEd, ATC

## 2017-08-25 ENCOUNTER — THERAPY VISIT (OUTPATIENT)
Dept: CHIROPRACTIC MEDICINE | Facility: CLINIC | Age: 57
End: 2017-08-25
Payer: COMMERCIAL

## 2017-08-25 DIAGNOSIS — M99.05 SOMATIC DYSFUNCTION OF PELVIS REGION: Primary | ICD-10-CM

## 2017-08-25 DIAGNOSIS — M79.10 MYALGIA: ICD-10-CM

## 2017-08-25 DIAGNOSIS — M25.552 HIP PAIN, LEFT: ICD-10-CM

## 2017-08-25 DIAGNOSIS — M76.899 HAMSTRING TENDINITIS AT ORIGIN: ICD-10-CM

## 2017-08-25 PROCEDURE — 98940 CHIROPRACT MANJ 1-2 REGIONS: CPT | Mod: AT | Performed by: CHIROPRACTOR

## 2017-08-25 PROCEDURE — 97110 THERAPEUTIC EXERCISES: CPT | Performed by: CHIROPRACTOR

## 2017-08-25 NOTE — MR AVS SNAPSHOT
After Visit Summary   8/25/2017    Marycruz Tran    MRN: [id number]           Patient Information     Date Of Birth          1960        Visit Information        Provider Department      8/25/2017 2:30 PM Forest Barroso DC [facility] - [place] Chiropractor        Today's Diagnoses     Somatic dysfunction of pelvis region    -  1    Hamstring tendinitis at origin        Hip pain, left        Myalgia           Follow-ups after your visit        Who to contact     If you have questions or need follow up information about today's clinic visit or your schedule please contact [place] [facility] - [place] CHIROPRACTOR directly at [phone].  Normal or non-critical lab and imaging results will be communicated to you by IWThart, letter or phone within 4 business days after the clinic has received the results. If you do not hear from us within 7 days, please contact the clinic through "[employer]t or phone. If you have a critical or abnormal lab result, we will notify you by phone as soon as possible.  Submit refill requests through Proginet or call your pharmacy and they will forward the refill request to us. Please allow 3 business days for your refill to be completed.          Additional Information About Your Visit        MyChart Information     Proginet gives you secure access to your electronic health record. If you see a primary care provider, you can also send messages to your care team and make appointments. If you have questions, please call your primary care clinic.  If you do not have a primary care provider, please call [phone] and they will assist you.        Care EveryWhere ID     This is your Care EveryWhere ID. This could be used by other organizations to access your [place] medical records  [id number]         Blood Pressure from Last 3 Encounters:   04/14/17 122/64   12/06/16 120/80   10/03/16 104/75    Weight from Last 3  Encounters:   04/25/17 68 kg (150 lb)   04/20/17 68 kg (150 lb)   04/14/17 68 kg (150 lb)              We Performed the Following     CHIROPRAC MANIP,SPINAL,1-2 REGIONS     THERAPEUTIC EXERCISES        Primary Care Provider Office Phone # Fax #    Fabienne Haley [phone] [address] [facility] [address]  [facility] [address]        Equal Access to Services     TARA HANCOCK : Hadii aad ku hadasho Soomaali, waaxda luqadaha, qaybta kaalmada adeegyada, waxay idiin hayaan adeeg khmarlash lasoheila . So [facility] [phone].    ATENCIÓN: Si denise wright, tiene a urban disposición servicios gratuitos de asistencia lingüística. Flaca al [phone].    We comply with applicable federal civil rights laws and [place] laws. We do not discriminate on the basis of race, color, national origin, age, disability sex, sexual orientation or gender identity.            Thank you!     Thank you for choosing [place] [facility] [place] [facility]  for your care. Our goal is always to provide you with excellent care. Hearing back from our patients is one way we can continue to improve our services. Please take a few minutes to complete the written survey that you may receive in the mail after your visit with us. Thank you!             Your Updated Medication List - Protect others around you: Learn how to safely use, store and throw away your medicines at www.disposemymeds.org.          This list is accurate as of: 8/25/17 11:59 PM.  Always use your most recent med list.                   Brand Name Dispense Instructions for use Diagnosis    calcium carb-cholecalciferol 600-500 MG-UNIT Caps      Take 1 tablet by mouth 2 times daily        estradiol 0.1 MG/GM cream    ESTRACE VAGINAL    42.5 g    Place 1 g vaginally twice a week Place 1g vaginally nightly x 2 weeks and then twice weely for maintenance    Vaginal atrophy       IBUPROFEN PO      Take 400 mg by mouth every 4 hours as needed for moderate pain         Multi-vitamin Tabs tablet      Take 1 tablet by mouth daily

## 2017-08-26 NOTE — PROGRESS NOTES
[facility]      Subjective:  Marycruz Tran   56 year old   female    CC: Left butt muscle pain and knee pain, referral from Radha MERRITT  Medications reviewed: Denies any medications  Visit: 7  Goal: sit for 30 minutes without pain, walk 30 minutes without pain, decrease pain 50%, run again    Location: L posterior hip   KAILA: Notes runner, 30 years, slow and steady according to her, notes previous history of this pain, notes few months ago started up again. Has not ran since 12/2016. Started seeing PT in 1/2017 weekly and notes that she has been working on piriformis syndrome and working on glute strength. Notes some progress. Notes also saw DC a couple times. Walking is going OK, but that has created pain in past.   Pain: varies but 3/10 on average, worse with sitting  Comes in today dong well. Tolerated last session well. Was not sore. Has been biking pain free. Much less pain sitting and with normal ADL's. Has not ran yet. Working on active care program. Denies any new issues and feels like she is progressing well. Denies any new issues.     Objective:  Inspection:  No SDD  No Scars  Normal Gait    Palpation:  No specific pain  Palpable soreness over L posterior hip   Myofascitis 2/4 noted over L piriformis    ROM:  Lumbar flexion   90/90  Lumbar extension  30/30, mild lower back discomfort   Right Hip IR  38/45  Left Hip IR  29/45  Right Hip ER  42/60  Left  hip ER  39/60  Hip abduction limited on left 5 degrees with no pain  Hip adduction symmetric     MMT:  Left glute med 4/5 with no pain  Right glute med 5/5 with no pain  Left TFL 4/5 with no pain  Right TFL 5/5 with no pain  Left piriformis 5/5 with no pain  Right piriformis 5/5 with no pain    MET:  Right short leg  Right superior pubic bone  Right hip out flare    Squat:  Shift to right  Foot flare to left  Arm drop on right  Left knee genu valgum     Lunge:  L knee genu valgum  L knee cross over     NAL:  Restricted SI on left  side      Ortho:  SLR (tightness left hamstring), jazmin (posteiror hip pain on left), Fader (-), SI compression (-)     Assessment:  NAL with associated myofascitis and weakness  Hip pain, proximal hamstring tendinosis     Plan:    Patient tolerated treatment well today  Treatment Time: 45 minutes  68548 manipulation 1-2 segments: MET, Hip LAD  62034 Manual therapy: (ART, Graston, Strain Counter Strain, Fascial Manipulation, Cupping) performed over area of L hip ER's, L hamstring, L adductors   64959 therapeutic exercise (30 minutes):Review of the following: clams, side lying leg raises, single leg step, tried to do band walks, marching bridges, bridges with walks, flossing has helped in past, gave seated piriformis flossing, reverse clams with yellow band, single leg RDL's  Reviewed step downs, butterfly stretch, clams, marching  Bridges  Today: side lying hip rotation bottom leg clams, marching straight leg bridges with weight transfer, added band capsules stretch and band PIR activation, doing nordics and hamstring curls in the gym   Will try running by next visit so see how that goes  Strapping: hip IR and lateral glide on left, pigeon self mobility   Taping:  Shock wave: 20/12, posterior hip, felt deep ache (did not do today)  Next visit: 2  weeks  Dry needle: 10 proximal hamstring (did not do today)  Other: talked about 3-4 sessions and if not responding would encourage another consult with Dr. Geller. She agreed to this.     Forest Barroso DC, MEd, ATC

## 2017-09-21 ENCOUNTER — OFFICE VISIT (OUTPATIENT)
Dept: ORTHOPEDICS | Facility: CLINIC | Age: 57
End: 2017-09-21
Payer: COMMERCIAL

## 2017-09-21 DIAGNOSIS — M76.899 HAMSTRING TENDINITIS AT ORIGIN: Primary | ICD-10-CM

## 2017-09-21 PROCEDURE — 20999 UNLISTED PX MUSCSKEL GENERAL: CPT | Performed by: FAMILY MEDICINE

## 2017-09-21 PROCEDURE — 99213 OFFICE O/P EST LOW 20 MIN: CPT | Performed by: FAMILY MEDICINE

## 2017-09-21 NOTE — MR AVS SNAPSHOT
After Visit Summary   9/21/2017    Marycruz Tran    MRN: [id number]           Patient Information     Date Of Birth          1960        Visit Information        Provider Department      9/21/2017 3:40 PM Harpreet Geller MD [facility]        Today's Diagnoses     Hamstring tendinitis at origin    -  1       Follow-ups after your visit        Your next 10 appointments already scheduled     Oct 10, 2017  8:45 AM CDT   [facility] with Forest Barroso DC   [facility] [place] [facility] ([facility] [place])    [address] [place] [address]  [phone]  [place] [address] [phone]              Who to contact     If you have questions or need follow up information about today's clinic visit or your schedule please contact [facility] directly at [phone].  Normal or non-critical lab and imaging results will be communicated to you by [employer], letter or phone within 4 business days after the clinic has received the results. If you do not hear from us within 7 days, please contact the clinic through MorphoS[employer] or phone. If you have a critical or abnormal lab result, we will notify you by phone as soon as possible.  Submit refill requests through Augure or call your pharmacy and they will forward the refill request to us. Please allow 3 business days for your refill to be completed.          Additional Information About Your Visit        [employer] Education Information     Augure gives you secure access to your electronic health record. If you see a primary care provider, you can also send messages to your care team and make appointments. If you have questions, please call your primary care clinic.  If you do not have a primary care provider, please call [phone] and they will assist you.        Care EveryWhere ID     This is your Care EveryWhere ID. This could be used  by other organizations to access your [facility] medical records  [id number]         Blood Pressure from Last 3 Encounters:   04/14/17 122/64   12/06/16 120/80   10/03/16 104/75    Weight from Last 3 Encounters:   04/25/17 150 lb (68 kg)   04/20/17 150 lb (68 kg)   04/14/17 150 lb (68 kg)              We Performed the Following     C INJ(S), WHOLE BLOOD, WITH IMAGE GUIDANCE        Primary Care Provider Office Phone # Fax #    Fabienne Haley [phone] [address]  [place] [address]        Equal Access to Services     [facility]: Hadii aad ku hadasho Soyumiko, waaxda luqadaha, qaybta kaalmada adekikiyada, kyra howe . So [place] [phone].    ATENCIÓN: Si habla español, tiene a urban disposición servicios gratuitos de asistencia lingüística. Llame al [phone].    We comply with applicable federal civil rights laws and [place] laws. We do not discriminate on the basis of race, color, national origin, age, disability sex, sexual orientation or gender identity.            Thank you!     Thank you for choosing [facility]  for your care. Our goal is always to provide you with excellent care. Hearing back from our patients is one way we can continue to improve our services. Please take a few minutes to complete the written survey that you may receive in the mail after your visit with us. Thank you!             Your Updated Medication List - Protect others around you: Learn how to safely use, store and throw away your medicines at www.disposemymeds.org.          This list is accurate as of: 9/21/17  4:27 PM.  Always use your most recent med list.                   Brand Name Dispense Instructions for use Diagnosis    calcium carb-cholecalciferol 600-500 MG-UNIT Caps      Take 1 tablet by mouth 2 times daily        estradiol 0.1 MG/GM cream    ESTRACE VAGINAL    42.5 g    Place 1 g vaginally twice a week  Place 1g vaginally nightly x 2 weeks and then twice weely for maintenance    Vaginal atrophy       IBUPROFEN PO      Take 400 mg by mouth every 4 hours as needed for moderate pain        Multi-vitamin Tabs tablet      Take 1 tablet by mouth daily

## 2017-09-21 NOTE — PROGRESS NOTES
"[facility]   Follow-up Visit 9/21/2017       PCP: Fabienne Haley        Subjective:  Marycruz is a 56 year old female who is seen in follow up for evaluation of        Chief Complaint   Patient presents with     RECHECK       prolotherapy for L proximal hamstring      Her last visit was on 4/20/2017. She is following up today for prolotherapy of the left proximal hamstring.     Did well after last prolotherapy injection in April, but pain slowly came back several weeks later as she resumed running, despite doing some rehab work in the interim. Discussed with oFrest who recommended a repeat injection today. She presents to discuss and determine next steps.      Patient's past medical, surgical, social and family histories are reviewed today.     Social History: works as a       PMH, PSHX, FMHX, SOCHX all reviewed and are unremarkable or noncontributory to today's visit.  See intake form for details.     Review of Systems   Musculoskeletal: Positive for joint pain.   All other systems reviewed and are negative.           Physical Exam  Ht 5' 6\" (1.676 m)  Wt 150 lb (68 kg)  BMI 24.21 kg/m2  Constitutional:well-developed, well-nourished, and in no distress.   Cardiovascular: Intact distal pulses.    Neurological: alert. Gait Normal:   Gait, station, stance, and balance appear normal for age  Skin: Skin is warm and dry.   Psychiatric: Mood and affect normal.   Respiratory: unlabored, speaks in full sentences  Lymph: no LAD, no lymphangitis        Left Hip Exam      Tenderness   The patient is experiencing no tenderness in the ischial tuberosity.     Range of Motion   Extension:            Normal  Flexion:                 Normal  Internal Rotation:  Normal  External Rotation: Normal  Abduction:            Normal  Adduction:            Normal     Muscle Strength   Abduction:  5/5  Adduction:  5/5  Flexion:      5/5     Tests   Cathy: Negative  Juan A:  " N/t     Comments:  Pain, cramping with resisted hamstring flexion            MR Pelvis w/o Contrast     Narrative     MR PELVIS WITHOUT CONTRAST  4/18/2017 5:12 PM     HISTORY:  Evaluate proximal hamstring pain. Other specified  enthesopathies of left lower limb, excluding foot. Pain in left hip.     TECHNIQUE:  Coronal T1 and inversion recovery, sagittal T1, and  transverse proton density and fat suppressed T2 weighted images.     FINDINGS:   Osseous and Cartilaginous Structures:  No fracture or destructive bone  lesion.  No femoral head osteonecrosis.  No significant hip  osteoarthritis or apparent chondromalacia.        Acetabular Labrum: No juxtaacetabular cyst.  No obvious labral tear is  appreciated, allowing for the large FOV technique.  If indicated  clinically, MR arthrography would be considered the study of choice in  this regard.     Common Hamstring Tendon: There may be mild tendinosis of the common  hamstring tendons at the ischial tuberosity attachment. However, this  appears to be relatively symmetrical. No krystina tear or tendon rupture  is demonstrated.     Gluteal Tendon Greater Trochanteric Attachments: The gluteus medius  and minimus tendons appear within normal limits and symmetrical.     Trochanteric and Iliopsoas Bursae: No fluid collection in the  trochanteric and iliopsoas bursae.     Joint space: No joint effusion.      Additional findings: There is a small amount of free peritoneal fluid  in the cul-de-sac. Apophyseal joint degenerative arthrosis is noted at  L4-L5 without periarticular reactive marrow edema. However, this scan  is not tailored to optimally evaluate the lumbar spine.     Impression     IMPRESSION:   1. Probable mild bilateral common hamstring tendinosis at the ischial  tuberosity attachments. This appears relatively symmetrical. No krystina  tear or tendon rupture is demonstrated.  2. L4-L5 apophyseal joint degenerative arthrosis without periarticular  reactive marrow  edema.  3. Nonspecific small amount of free peritoneal fluid within the  cul-de-sac.     ROSARIO SOLIS MD     ASSESSMENT/PLAN    ICD-10-CM    1. Hamstring tendinitis at origin M76.899 C INJ(S), WHOLE BLOOD, WITH IMAGE GUIDANCE     Discussed options, including formal rehab with gait analysis, prolotherapy with PRP or repeat autologous blood. Pt opted for latter.     Left Hip proximal hamstring autologous blood prolotherapy  Injection     Diagnoses (preoperative and postoperative):  left Hamstring tendinitis of left thigh  (primary encounter diagnosis)  Current Procedure (include preoperative):  Sonographically guided left  hamstring proximal tendon prolotherapy injection  Current Indication (include preoperative):  Alleviation of pain  REFERRED BY:  Dr. Barroso  REASON FOR PROCEDURE: . Sonographic guidance will be used to ensure accurate placement of the medication within the joint space and avoid nearby neurovascular structures.  PATIENT EDUCATION:  Ready to learn with no apparent learning barriers identified.  Learning preferences include listening. Explained diagnosis and treatment plan as well as treatment alternatives. Patient expressed understanding of the content.  Following denial of allergy and review of potential side effects and complications including but not necessarily limited to infection, bleeding, allergic reaction, post-injection flare, local tissue breakdown (including but not limited to potential for skin depigmentation and/or subcutaneous fat atrophy),  injury to soft tissue and/or nerves and seizure, patient indicated their understanding and agreed to proceed. Written and signed consent was obtained and is scanned into the chart.  PROCEDURE: Prior to the procedure, the left posterior hip joint was examined with a 4 MHz curvilinear transducer to visualize the hamstring tendon and determine the approach for the procedure.  Procedure was carried out using sterile technique including chloroprep,  and a sterile transducer gel. A simple surgical tray was used.  PROCEDURAL PAUSE:  Procedural pause conducted to verify correct patient identity, procedure to be performed, and as applicable, correct side/site, correct patient position.  Patient position: Supine  Transducer type: 4 MHz curvilinear  Approach: Lateral to medial parallel to long axis of transducer  Local Anesthesia: Sonographically guided 22-gauge 1.5 inch needle was used to anesthetize the skin and subcutaneous tissue with 5 ml of 1% Lidocaine  Aspirate: None     Prior to the procedure 5cc of autologous blood obtained from the RIGHT antecubital fossa without difficulty.   Injectate:  Sonographically guided 22-gauge 2.5 inch needle was directly visualized entering down into the left hamstring tendon and 5cc autologous blood was injected in and around the hamstring tendon.   AFTERCARE:  Patient tolerated the procedure without complication. After a short observation period, patient was discharged under their own power and in excellent condition.    Monitoring for infection was discussed.     gradual return to activity discussed. Repeat injection at 6-12 weeks an option if needed    Time spent in one-on-one evalution and discussion with patient regarding nature of problem, course, prior treatments, and therapeutic options, at least 50% of which was spent in counseling and coordination of care: 15 minutes, over and above time spent on injection.  .

## 2017-10-30 ENCOUNTER — TRANSFERRED RECORDS (OUTPATIENT)
Dept: HEALTH INFORMATION MANAGEMENT | Facility: CLINIC | Age: 57
End: 2017-10-30

## 2017-11-27 ENCOUNTER — MYC MEDICAL ADVICE (OUTPATIENT)
Dept: OBGYN | Facility: CLINIC | Age: 57
End: 2017-11-27

## 2017-11-27 NOTE — TELEPHONE ENCOUNTER
Please see my chart message below. Routing to Dr. Haley. AVELINO  Last Pap 11/13/12, negative , HPV negative  Last mammogram 12/6/16

## 2017-11-27 NOTE — TELEPHONE ENCOUNTER
Great --thanks for the update.  And of course, if for any reason, her new primary does not do breast or pelvic exams/pap smears, she can always see me for this portion of the exam.

## 2017-11-28 ENCOUNTER — MYC MEDICAL ADVICE (OUTPATIENT)
Dept: OBGYN | Facility: CLINIC | Age: 57
End: 2017-11-28

## 2017-11-28 NOTE — TELEPHONE ENCOUNTER
I recommend mammograms yearly --as does ACOG, American Cancer Society, American Radiology Society, etc.

## 2017-11-28 NOTE — TELEPHONE ENCOUNTER
Pt sent [employer] below. Pt's health maintenance notes every 2 yr's for a mammogram. Routing to Dr. Haley: should pt be having mammograms every 2 yrs or is this an error and should be every yr?

## 2017-12-18 ENCOUNTER — TELEPHONE (OUTPATIENT)
Dept: OBGYN | Facility: CLINIC | Age: 57
End: 2017-12-18

## 2017-12-18 NOTE — TELEPHONE ENCOUNTER
12/18/2017    Call Regarding Preventive Health Screening Cervical/PAP    Attempt 1    Message on voicemail     Comments:       Outreach   SB

## 2018-01-20 ENCOUNTER — HEALTH MAINTENANCE LETTER (OUTPATIENT)
Age: 58
End: 2018-01-20

## 2018-04-17 NOTE — PATIENT INSTRUCTIONS
Assessment:  1. Hamstring tendinitis of left thigh        Plan:  Prolotherapy injection of the left proximal hamstring    Avoid excessive activity for the next 3-4 days, limit activity to walking only during that time.     May gradually return to activity as pain allows thereafter    Avoid use of NSAIDs for the next 7 days    Injection could be repeated after 6 weeks if showing partial but not full pain relief at that time    Call if developing any signs or concern for infection     Pt cannot understand the question

## 2020-02-08 ENCOUNTER — HEALTH MAINTENANCE LETTER (OUTPATIENT)
Age: 60
End: 2020-02-08

## 2020-11-07 ENCOUNTER — HEALTH MAINTENANCE LETTER (OUTPATIENT)
Age: 60
End: 2020-11-07

## 2021-03-21 ENCOUNTER — HEALTH MAINTENANCE LETTER (OUTPATIENT)
Age: 61
End: 2021-03-21

## 2021-09-05 ENCOUNTER — HEALTH MAINTENANCE LETTER (OUTPATIENT)
Age: 61
End: 2021-09-05

## 2021-09-07 DIAGNOSIS — Z11.59 ENCOUNTER FOR SCREENING FOR OTHER VIRAL DISEASES: ICD-10-CM

## 2021-10-08 ENCOUNTER — LAB (OUTPATIENT)
Dept: URGENT CARE | Facility: URGENT CARE | Age: 61
End: 2021-10-08
Attending: COLON & RECTAL SURGERY
Payer: COMMERCIAL

## 2021-10-08 DIAGNOSIS — Z11.59 ENCOUNTER FOR SCREENING FOR OTHER VIRAL DISEASES: ICD-10-CM

## 2021-10-08 LAB — SARS-COV-2 RNA RESP QL NAA+PROBE: NEGATIVE

## 2021-10-08 PROCEDURE — U0005 INFEC AGEN DETEC AMPLI PROBE: HCPCS

## 2021-10-08 PROCEDURE — U0003 INFECTIOUS AGENT DETECTION BY NUCLEIC ACID (DNA OR RNA); SEVERE ACUTE RESPIRATORY SYNDROME CORONAVIRUS 2 (SARS-COV-2) (CORONAVIRUS DISEASE [COVID-19]), AMPLIFIED PROBE TECHNIQUE, MAKING USE OF HIGH THROUGHPUT TECHNOLOGIES AS DESCRIBED BY CMS-2020-01-R: HCPCS

## 2021-10-11 ENCOUNTER — HOSPITAL ENCOUNTER (OUTPATIENT)
Facility: CLINIC | Age: 61
Discharge: HOME | End: 2021-10-11
Attending: COLON & RECTAL SURGERY | Admitting: COLON & RECTAL SURGERY
Payer: COMMERCIAL

## 2021-10-11 VITALS
TEMPERATURE: 97.6 F | BODY MASS INDEX: 23.3 KG/M2 | HEIGHT: 66 IN | RESPIRATION RATE: 13 BRPM | DIASTOLIC BLOOD PRESSURE: 75 MMHG | SYSTOLIC BLOOD PRESSURE: 111 MMHG | OXYGEN SATURATION: 99 % | HEART RATE: 62 BPM | WEIGHT: 145 LBS

## 2021-10-11 PROCEDURE — 250N000011 HC RX IP 250 OP 636: Performed by: COLON & RECTAL SURGERY

## 2021-10-11 PROCEDURE — 45378 DIAGNOSTIC COLONOSCOPY: CPT | Performed by: COLON & RECTAL SURGERY

## 2021-10-11 PROCEDURE — 258N000003 HC RX IP 258 OP 636: Performed by: COLON & RECTAL SURGERY

## 2021-10-11 PROCEDURE — G0500 MOD SEDAT ENDO SERVICE >5YRS: HCPCS | Performed by: COLON & RECTAL SURGERY

## 2021-10-11 PROCEDURE — G0105 COLORECTAL SCRN; HI RISK IND: HCPCS | Performed by: COLON & RECTAL SURGERY

## 2021-10-11 RX ORDER — LIDOCAINE 40 MG/G
CREAM TOPICAL
Status: DISCONTINUED | OUTPATIENT
Start: 2021-10-11 | End: 2021-10-11 | Stop reason: HOSPADM

## 2021-10-11 RX ORDER — ONDANSETRON 2 MG/ML
4 INJECTION INTRAMUSCULAR; INTRAVENOUS
Status: DISCONTINUED | OUTPATIENT
Start: 2021-10-11 | End: 2021-10-11 | Stop reason: HOSPADM

## 2021-10-11 RX ORDER — NALOXONE HYDROCHLORIDE 0.4 MG/ML
0.2 INJECTION, SOLUTION INTRAMUSCULAR; INTRAVENOUS; SUBCUTANEOUS
Status: DISCONTINUED | OUTPATIENT
Start: 2021-10-11 | End: 2021-10-11 | Stop reason: HOSPADM

## 2021-10-11 RX ORDER — NALOXONE HYDROCHLORIDE 0.4 MG/ML
0.4 INJECTION, SOLUTION INTRAMUSCULAR; INTRAVENOUS; SUBCUTANEOUS
Status: DISCONTINUED | OUTPATIENT
Start: 2021-10-11 | End: 2021-10-11 | Stop reason: HOSPADM

## 2021-10-11 RX ORDER — SODIUM CHLORIDE 9 MG/ML
INJECTION, SOLUTION INTRAVENOUS CONTINUOUS PRN
Status: COMPLETED | OUTPATIENT
Start: 2021-10-11 | End: 2021-10-11

## 2021-10-11 RX ORDER — PROCHLORPERAZINE MALEATE 10 MG
10 TABLET ORAL EVERY 6 HOURS PRN
Status: DISCONTINUED | OUTPATIENT
Start: 2021-10-11 | End: 2021-10-11 | Stop reason: HOSPADM

## 2021-10-11 RX ORDER — FLUMAZENIL 0.1 MG/ML
0.2 INJECTION, SOLUTION INTRAVENOUS
Status: DISCONTINUED | OUTPATIENT
Start: 2021-10-11 | End: 2021-10-11 | Stop reason: HOSPADM

## 2021-10-11 RX ORDER — ONDANSETRON 2 MG/ML
INJECTION INTRAMUSCULAR; INTRAVENOUS PRN
Status: COMPLETED | OUTPATIENT
Start: 2021-10-11 | End: 2021-10-11

## 2021-10-11 RX ORDER — ONDANSETRON 2 MG/ML
4 INJECTION INTRAMUSCULAR; INTRAVENOUS EVERY 6 HOURS PRN
Status: DISCONTINUED | OUTPATIENT
Start: 2021-10-11 | End: 2021-10-11 | Stop reason: HOSPADM

## 2021-10-11 RX ORDER — ONDANSETRON 4 MG/1
4 TABLET, ORALLY DISINTEGRATING ORAL EVERY 6 HOURS PRN
Status: DISCONTINUED | OUTPATIENT
Start: 2021-10-11 | End: 2021-10-11 | Stop reason: HOSPADM

## 2021-10-11 RX ORDER — FENTANYL CITRATE 50 UG/ML
INJECTION, SOLUTION INTRAMUSCULAR; INTRAVENOUS PRN
Status: COMPLETED | OUTPATIENT
Start: 2021-10-11 | End: 2021-10-11

## 2021-10-11 RX ADMIN — MIDAZOLAM 1 MG: 1 INJECTION INTRAMUSCULAR; INTRAVENOUS at 08:59

## 2021-10-11 RX ADMIN — SODIUM CHLORIDE 125 ML/HR: 9 INJECTION, SOLUTION INTRAVENOUS at 08:29

## 2021-10-11 RX ADMIN — ONDANSETRON 4 MG: 2 INJECTION INTRAMUSCULAR; INTRAVENOUS at 08:34

## 2021-10-11 RX ADMIN — FENTANYL CITRATE 100 MCG: 50 INJECTION, SOLUTION INTRAMUSCULAR; INTRAVENOUS at 08:55

## 2021-10-11 RX ADMIN — MIDAZOLAM 2 MG: 1 INJECTION INTRAMUSCULAR; INTRAVENOUS at 08:56

## 2021-10-11 ASSESSMENT — MIFFLIN-ST. JEOR: SCORE: 1239.47

## 2021-10-11 NOTE — H&P
Pre-Endoscopy History and Physical     Marycruz Tran MRN# [id number]   YOB: 1960 Age: 61 year old     Date of Procedure: 10/11/2021  Primary care provider: Noy Art  Type of Endoscopy: colonoscopy  Reason for Procedure: screening  Type of Anesthesia Anticipated: Moderate Sedation    HPI:    Marycruz is a 61 year old female who will be undergoing the above procedure.      A history and physical has been performed. The patient's medications and allergies have been reviewed. The risks and benefits of the procedure including the risk of bleeding, perforation, and missed lesions as well as the sedation options and risks were discussed with the patient.  All questions were answered and informed consent was obtained.      Allergies   Allergen Reactions     Augmentin Difficulty breathing        Current Facility-Administered Medications   Medication     fentaNYL (PF) (SUBLIMAZE) injection     ondansetron (ZOFRAN) injection     sodium chloride 0.9% infusion       Medications Prior to Admission   Medication Sig Dispense Refill Last Dose     cholecalciferol (VITAMIN D3) 25 mcg (1000 units) capsule Take 1 capsule by mouth daily   More than a month at Unknown time     estradiol (ESTRACE VAGINAL) 0.1 MG/GM cream Place 1 g vaginally twice a week Place 1g vaginally nightly x 2 weeks and then twice weely for maintenance 42.5 g 3      IBUPROFEN PO Take 400 mg by mouth every 4 hours as needed for moderate pain          Patient Active Problem List   Diagnosis     Decreased libido     Family history of osteoporosis     Adenomatous colon polyp     Osteopenia of right thigh     Somatic dysfunction of pelvis region     Hip pain, left     Myalgia     Hamstring tendinitis at origin     Hamstring tendinitis of left thigh        Past Medical History:   Diagnosis Date     Adenomatous colon polyp 10/2016    q5yrs     Decreased libido     tried testosterone 11/2012     Endometriosis      Infertility, female      Menopausal  "syndrome      Osteopenia of right thigh 2015     Sexual dysfunction     low libido -referred to Sariah Ku 11/2009        Past Surgical History:   Procedure Laterality Date     COLONOSCOPY  12/5/2011    Procedure:COLONOSCOPY; COLONOSCOPY; Surgeon:YINA BAUTISTA; Location: GI     CONIZATION  1990     ENDOMETRIAL SAMPLING (BIOPSY)  2001, 2005 2005->DUB; inactive/weakly proliferative endometrium         2001-->benign     ENDOSCOPIC RELEASE CARPAL TUNNEL Left 1/8/2016    Procedure: ENDOSCOPIC RELEASE CARPAL TUNNEL;  Surgeon: Pratibha Beavers MD;  Location:  OR     LAPAROSCOPY DIAGNOSTIC (GYN)  9/1993    Stage I endometriosis     OPEN REDUCTION INTERNAL FIXATION WRIST Left 1/8/2016    Procedure: OPEN REDUCTION INTERNAL FIXATION WRIST;  Surgeon: Pratibha Beavers MD;  Location:  OR       Social History     Tobacco Use     Smoking status: Former Smoker     Smokeless tobacco: Never Used   Substance Use Topics     Alcohol use: Yes     Alcohol/week: 0.0 standard drinks     Comment: 10 week       Family History   Problem Relation Age of Onset     Cancer - colorectal Father 68     Colon Cancer Father      Breast Cancer Mother 75     Hypertension Mother      Osteoporosis Mother          PHYSICAL EXAM:   /83   Pulse 67   Temp 97.6  F (36.4  C) (Skin)   Resp 14   Ht 1.676 m (5' 6\")   Wt 65.8 kg (145 lb)   SpO2 97%   BMI 23.40 kg/m   Estimated body mass index is 23.4 kg/m  as calculated from the following:    Height as of this encounter: 1.676 m (5' 6\").    Weight as of this encounter: 65.8 kg (145 lb).   Mental status - alert and oriented  RESP: lungs clear  CV: RRR  AIRWAY EXAM: Mallampatti Class II (visualization of the soft palate, fauces, and uvula)    IMPRESSION   ASA Class 2 - Mild systemic disease      Signed Electronically by: Brenton Stanley MD  October 11, 2021    Colorectal Surgery  [phone] (office)  [phone] (pager)  [email or web address]          "

## 2021-10-13 LAB — COLONOSCOPY: NORMAL

## 2022-04-17 ENCOUNTER — HEALTH MAINTENANCE LETTER (OUTPATIENT)
Age: 62
End: 2022-04-17

## 2022-10-23 ENCOUNTER — HEALTH MAINTENANCE LETTER (OUTPATIENT)
Age: 62
End: 2022-10-23

## 2023-04-02 ENCOUNTER — HEALTH MAINTENANCE LETTER (OUTPATIENT)
Age: 63
End: 2023-04-02

## 2023-11-05 ENCOUNTER — HEALTH MAINTENANCE LETTER (OUTPATIENT)
Age: 63
End: 2023-11-05

## (undated) RX ORDER — ONDANSETRON 2 MG/ML
INJECTION INTRAMUSCULAR; INTRAVENOUS
Status: DISPENSED
Start: 2021-10-11

## (undated) RX ORDER — FENTANYL CITRATE 50 UG/ML
INJECTION, SOLUTION INTRAMUSCULAR; INTRAVENOUS
Status: DISPENSED
Start: 2021-10-11